# Patient Record
Sex: MALE | Race: WHITE | NOT HISPANIC OR LATINO | Employment: FULL TIME | URBAN - METROPOLITAN AREA
[De-identification: names, ages, dates, MRNs, and addresses within clinical notes are randomized per-mention and may not be internally consistent; named-entity substitution may affect disease eponyms.]

---

## 2019-12-08 ENCOUNTER — HOSPITAL ENCOUNTER (INPATIENT)
Facility: HOSPITAL | Age: 49
LOS: 2 days | Discharge: HOME | End: 2019-12-10
Attending: EMERGENCY MEDICINE | Admitting: INTERNAL MEDICINE

## 2019-12-08 ENCOUNTER — APPOINTMENT (OUTPATIENT)
Dept: GENERAL RADIOLOGY | Facility: HOSPITAL | Age: 49
End: 2019-12-08

## 2019-12-08 DIAGNOSIS — R79.89 ELEVATED LFTS: ICD-10-CM

## 2019-12-08 DIAGNOSIS — I21.11 ST ELEVATION MYOCARDIAL INFARCTION INVOLVING RIGHT CORONARY ARTERY (HCC): ICD-10-CM

## 2019-12-08 DIAGNOSIS — I21.19 ACUTE MI, INFERIOR WALL (HCC): Primary | ICD-10-CM

## 2019-12-08 LAB
ALBUMIN SERPL-MCNC: 4 G/DL (ref 3.5–5.2)
ALBUMIN/GLOB SERPL: 1.6 G/DL
ALP SERPL-CCNC: 268 U/L (ref 39–117)
ALT SERPL W P-5'-P-CCNC: 26 U/L (ref 1–41)
ANION GAP SERPL CALCULATED.3IONS-SCNC: 11.3 MMOL/L (ref 5–15)
AST SERPL-CCNC: 21 U/L (ref 1–40)
BASOPHILS # BLD AUTO: 0.07 10*3/MM3 (ref 0–0.2)
BASOPHILS NFR BLD AUTO: 1.1 % (ref 0–1.5)
BILIRUB SERPL-MCNC: 0.3 MG/DL (ref 0.2–1.2)
BUN BLD-MCNC: 15 MG/DL (ref 6–20)
BUN/CREAT SERPL: 12.4 (ref 7–25)
CALCIUM SPEC-SCNC: 8.8 MG/DL (ref 8.6–10.5)
CHLORIDE SERPL-SCNC: 107 MMOL/L (ref 98–107)
CO2 SERPL-SCNC: 24.7 MMOL/L (ref 22–29)
CREAT BLD-MCNC: 1.21 MG/DL (ref 0.76–1.27)
DEPRECATED RDW RBC AUTO: 39.6 FL (ref 37–54)
EOSINOPHIL # BLD AUTO: 0.28 10*3/MM3 (ref 0–0.4)
EOSINOPHIL NFR BLD AUTO: 4.4 % (ref 0.3–6.2)
ERYTHROCYTE [DISTWIDTH] IN BLOOD BY AUTOMATED COUNT: 11.8 % (ref 12.3–15.4)
GFR SERPL CREATININE-BSD FRML MDRD: 64 ML/MIN/1.73
GLOBULIN UR ELPH-MCNC: 2.5 GM/DL
GLUCOSE BLD-MCNC: 131 MG/DL (ref 65–99)
GLUCOSE BLDC GLUCOMTR-MCNC: 96 MG/DL (ref 70–130)
HCT VFR BLD AUTO: 41.2 % (ref 37.5–51)
HGB BLD-MCNC: 14.8 G/DL (ref 13–17.7)
HOLD SPECIMEN: NORMAL
HOLD SPECIMEN: NORMAL
IMM GRANULOCYTES # BLD AUTO: 0.05 10*3/MM3 (ref 0–0.05)
IMM GRANULOCYTES NFR BLD AUTO: 0.8 % (ref 0–0.5)
INR PPP: 1.08 (ref 0.9–1.1)
LYMPHOCYTES # BLD AUTO: 1.87 10*3/MM3 (ref 0.7–3.1)
LYMPHOCYTES NFR BLD AUTO: 29.5 % (ref 19.6–45.3)
MAGNESIUM SERPL-MCNC: 2 MG/DL (ref 1.6–2.6)
MCH RBC QN AUTO: 33 PG (ref 26.6–33)
MCHC RBC AUTO-ENTMCNC: 35.9 G/DL (ref 31.5–35.7)
MCV RBC AUTO: 92 FL (ref 79–97)
MONOCYTES # BLD AUTO: 0.59 10*3/MM3 (ref 0.1–0.9)
MONOCYTES NFR BLD AUTO: 9.3 % (ref 5–12)
NEUTROPHILS # BLD AUTO: 3.47 10*3/MM3 (ref 1.7–7)
NEUTROPHILS NFR BLD AUTO: 54.9 % (ref 42.7–76)
NRBC BLD AUTO-RTO: 0 /100 WBC (ref 0–0.2)
PLATELET # BLD AUTO: 213 10*3/MM3 (ref 140–450)
PMV BLD AUTO: 10.1 FL (ref 6–12)
POTASSIUM BLD-SCNC: 4.5 MMOL/L (ref 3.5–5.2)
PROT SERPL-MCNC: 6.5 G/DL (ref 6–8.5)
PROTHROMBIN TIME: 13.7 SECONDS (ref 11.7–14.2)
RBC # BLD AUTO: 4.48 10*6/MM3 (ref 4.14–5.8)
SODIUM BLD-SCNC: 143 MMOL/L (ref 136–145)
TROPONIN T SERPL-MCNC: 0.63 NG/ML (ref 0–0.03)
TROPONIN T SERPL-MCNC: <0.01 NG/ML (ref 0–0.03)
WBC NRBC COR # BLD: 6.33 10*3/MM3 (ref 3.4–10.8)
WHOLE BLOOD HOLD SPECIMEN: NORMAL
WHOLE BLOOD HOLD SPECIMEN: NORMAL

## 2019-12-08 PROCEDURE — 85610 PROTHROMBIN TIME: CPT | Performed by: EMERGENCY MEDICINE

## 2019-12-08 PROCEDURE — 027034Z DILATION OF CORONARY ARTERY, ONE ARTERY WITH DRUG-ELUTING INTRALUMINAL DEVICE, PERCUTANEOUS APPROACH: ICD-10-PCS | Performed by: INTERNAL MEDICINE

## 2019-12-08 PROCEDURE — 99223 1ST HOSP IP/OBS HIGH 75: CPT | Performed by: INTERNAL MEDICINE

## 2019-12-08 PROCEDURE — 82962 GLUCOSE BLOOD TEST: CPT

## 2019-12-08 PROCEDURE — 25010000002 BH (CUPID ONLY) ADENOSINE 6 MG/100ML MIXTURE: Performed by: INTERNAL MEDICINE

## 2019-12-08 PROCEDURE — B2151ZZ FLUOROSCOPY OF LEFT HEART USING LOW OSMOLAR CONTRAST: ICD-10-PCS | Performed by: INTERNAL MEDICINE

## 2019-12-08 PROCEDURE — 92941 PRQ TRLML REVSC TOT OCCL AMI: CPT | Performed by: INTERNAL MEDICINE

## 2019-12-08 PROCEDURE — 25010000002 HEPARIN (PORCINE) PER 1000 UNITS: Performed by: INTERNAL MEDICINE

## 2019-12-08 PROCEDURE — 99283 EMERGENCY DEPT VISIT LOW MDM: CPT

## 2019-12-08 PROCEDURE — 25010000002 ONDANSETRON PER 1 MG: Performed by: INTERNAL MEDICINE

## 2019-12-08 PROCEDURE — 85347 COAGULATION TIME ACTIVATED: CPT

## 2019-12-08 PROCEDURE — B2111ZZ FLUOROSCOPY OF MULTIPLE CORONARY ARTERIES USING LOW OSMOLAR CONTRAST: ICD-10-PCS | Performed by: INTERNAL MEDICINE

## 2019-12-08 PROCEDURE — 93005 ELECTROCARDIOGRAM TRACING: CPT | Performed by: INTERNAL MEDICINE

## 2019-12-08 PROCEDURE — 25010000002 MIDAZOLAM PER 1 MG: Performed by: INTERNAL MEDICINE

## 2019-12-08 PROCEDURE — C1769 GUIDE WIRE: HCPCS | Performed by: INTERNAL MEDICINE

## 2019-12-08 PROCEDURE — C1874 STENT, COATED/COV W/DEL SYS: HCPCS | Performed by: INTERNAL MEDICINE

## 2019-12-08 PROCEDURE — C9606 PERC D-E COR REVASC W AMI S: HCPCS | Performed by: INTERNAL MEDICINE

## 2019-12-08 PROCEDURE — 4A023N7 MEASUREMENT OF CARDIAC SAMPLING AND PRESSURE, LEFT HEART, PERCUTANEOUS APPROACH: ICD-10-PCS | Performed by: INTERNAL MEDICINE

## 2019-12-08 PROCEDURE — C1725 CATH, TRANSLUMIN NON-LASER: HCPCS | Performed by: INTERNAL MEDICINE

## 2019-12-08 PROCEDURE — 25010000002 PHENYLEPHRINE PER 1 ML: Performed by: INTERNAL MEDICINE

## 2019-12-08 PROCEDURE — 0 IOPAMIDOL PER 1 ML: Performed by: INTERNAL MEDICINE

## 2019-12-08 PROCEDURE — 80053 COMPREHEN METABOLIC PANEL: CPT | Performed by: EMERGENCY MEDICINE

## 2019-12-08 PROCEDURE — C1894 INTRO/SHEATH, NON-LASER: HCPCS

## 2019-12-08 PROCEDURE — 93458 L HRT ARTERY/VENTRICLE ANGIO: CPT | Performed by: INTERNAL MEDICINE

## 2019-12-08 PROCEDURE — C1894 INTRO/SHEATH, NON-LASER: HCPCS | Performed by: INTERNAL MEDICINE

## 2019-12-08 PROCEDURE — 85025 COMPLETE CBC W/AUTO DIFF WBC: CPT | Performed by: EMERGENCY MEDICINE

## 2019-12-08 PROCEDURE — 93010 ELECTROCARDIOGRAM REPORT: CPT | Performed by: INTERNAL MEDICINE

## 2019-12-08 PROCEDURE — 83735 ASSAY OF MAGNESIUM: CPT | Performed by: EMERGENCY MEDICINE

## 2019-12-08 PROCEDURE — 84484 ASSAY OF TROPONIN QUANT: CPT | Performed by: EMERGENCY MEDICINE

## 2019-12-08 PROCEDURE — 84484 ASSAY OF TROPONIN QUANT: CPT | Performed by: INTERNAL MEDICINE

## 2019-12-08 PROCEDURE — 25010000002 ATROPINE PER 0.01 MG: Performed by: INTERNAL MEDICINE

## 2019-12-08 PROCEDURE — C1887 CATHETER, GUIDING: HCPCS | Performed by: INTERNAL MEDICINE

## 2019-12-08 DEVICE — XIENCE SIERRA™ EVEROLIMUS ELUTING CORONARY STENT SYSTEM 3.00 MM X 33 MM / RAPID-EXCHANGE
Type: IMPLANTABLE DEVICE | Status: FUNCTIONAL
Brand: XIENCE SIERRA™

## 2019-12-08 RX ORDER — PRASUGREL 10 MG/1
60 TABLET, FILM COATED ORAL ONCE
Status: DISCONTINUED | OUTPATIENT
Start: 2019-12-08 | End: 2019-12-10 | Stop reason: HOSPADM

## 2019-12-08 RX ORDER — LIDOCAINE HYDROCHLORIDE 20 MG/ML
INJECTION, SOLUTION INFILTRATION; PERINEURAL AS NEEDED
Status: DISCONTINUED | OUTPATIENT
Start: 2019-12-08 | End: 2019-12-08 | Stop reason: HOSPADM

## 2019-12-08 RX ORDER — HEPARIN SODIUM 5000 [USP'U]/ML
5000 INJECTION, SOLUTION INTRAVENOUS; SUBCUTANEOUS ONCE
Status: CANCELLED | OUTPATIENT
Start: 2019-12-08 | End: 2019-12-08

## 2019-12-08 RX ORDER — SODIUM CHLORIDE 9 MG/ML
INJECTION, SOLUTION INTRAVENOUS CONTINUOUS PRN
Status: COMPLETED | OUTPATIENT
Start: 2019-12-08 | End: 2019-12-08

## 2019-12-08 RX ORDER — NITROGLYCERIN 20 MG/100ML
10 INJECTION INTRAVENOUS
Status: DISCONTINUED | OUTPATIENT
Start: 2019-12-08 | End: 2019-12-08

## 2019-12-08 RX ORDER — ONDANSETRON 2 MG/ML
INJECTION INTRAMUSCULAR; INTRAVENOUS AS NEEDED
Status: DISCONTINUED | OUTPATIENT
Start: 2019-12-08 | End: 2019-12-08 | Stop reason: HOSPADM

## 2019-12-08 RX ORDER — ONDANSETRON 4 MG/1
4 TABLET, FILM COATED ORAL EVERY 6 HOURS PRN
Status: DISCONTINUED | OUTPATIENT
Start: 2019-12-08 | End: 2019-12-10 | Stop reason: HOSPADM

## 2019-12-08 RX ORDER — ASPIRIN 81 MG/1
81 TABLET, CHEWABLE ORAL DAILY
COMMUNITY
End: 2019-12-10 | Stop reason: HOSPADM

## 2019-12-08 RX ORDER — VERAPAMIL HYDROCHLORIDE 2.5 MG/ML
INJECTION, SOLUTION INTRAVENOUS AS NEEDED
Status: DISCONTINUED | OUTPATIENT
Start: 2019-12-08 | End: 2019-12-08 | Stop reason: HOSPADM

## 2019-12-08 RX ORDER — ONDANSETRON 2 MG/ML
4 INJECTION INTRAMUSCULAR; INTRAVENOUS EVERY 6 HOURS PRN
Status: DISCONTINUED | OUTPATIENT
Start: 2019-12-08 | End: 2019-12-10 | Stop reason: HOSPADM

## 2019-12-08 RX ORDER — SODIUM CHLORIDE 9 MG/ML
50 INJECTION, SOLUTION INTRAVENOUS CONTINUOUS
Status: ACTIVE | OUTPATIENT
Start: 2019-12-08 | End: 2019-12-09

## 2019-12-08 RX ORDER — HYDROCODONE BITARTRATE AND ACETAMINOPHEN 5; 325 MG/1; MG/1
1 TABLET ORAL EVERY 4 HOURS PRN
Status: DISCONTINUED | OUTPATIENT
Start: 2019-12-08 | End: 2019-12-10 | Stop reason: HOSPADM

## 2019-12-08 RX ORDER — SODIUM CHLORIDE 0.9 % (FLUSH) 0.9 %
10 SYRINGE (ML) INJECTION AS NEEDED
Status: DISCONTINUED | OUTPATIENT
Start: 2019-12-08 | End: 2019-12-10 | Stop reason: HOSPADM

## 2019-12-08 RX ORDER — HEPARIN SODIUM 1000 [USP'U]/ML
INJECTION, SOLUTION INTRAVENOUS; SUBCUTANEOUS AS NEEDED
Status: DISCONTINUED | OUTPATIENT
Start: 2019-12-08 | End: 2019-12-08 | Stop reason: HOSPADM

## 2019-12-08 RX ORDER — PROBENECID 500 MG/1
500 TABLET, FILM COATED ORAL 2 TIMES DAILY
COMMUNITY

## 2019-12-08 RX ORDER — METOPROLOL TARTRATE 5 MG/5ML
INJECTION INTRAVENOUS AS NEEDED
Status: DISCONTINUED | OUTPATIENT
Start: 2019-12-08 | End: 2019-12-08 | Stop reason: HOSPADM

## 2019-12-08 RX ORDER — NALOXONE HCL 0.4 MG/ML
0.4 VIAL (ML) INJECTION
Status: DISCONTINUED | OUTPATIENT
Start: 2019-12-08 | End: 2019-12-08

## 2019-12-08 RX ORDER — ATORVASTATIN CALCIUM 20 MG/1
40 TABLET, FILM COATED ORAL NIGHTLY
Status: DISCONTINUED | OUTPATIENT
Start: 2019-12-08 | End: 2019-12-10 | Stop reason: HOSPADM

## 2019-12-08 RX ORDER — MORPHINE SULFATE 2 MG/ML
2 INJECTION, SOLUTION INTRAMUSCULAR; INTRAVENOUS
Status: DISCONTINUED | OUTPATIENT
Start: 2019-12-08 | End: 2019-12-08

## 2019-12-08 RX ORDER — ACETAMINOPHEN 325 MG/1
650 TABLET ORAL EVERY 4 HOURS PRN
Status: DISCONTINUED | OUTPATIENT
Start: 2019-12-08 | End: 2019-12-10 | Stop reason: HOSPADM

## 2019-12-08 RX ORDER — PRASUGREL 10 MG/1
60 TABLET, FILM COATED ORAL DAILY
Status: DISCONTINUED | OUTPATIENT
Start: 2019-12-09 | End: 2019-12-08

## 2019-12-08 RX ORDER — MIDAZOLAM HYDROCHLORIDE 1 MG/ML
INJECTION INTRAMUSCULAR; INTRAVENOUS AS NEEDED
Status: DISCONTINUED | OUTPATIENT
Start: 2019-12-08 | End: 2019-12-08 | Stop reason: HOSPADM

## 2019-12-08 RX ORDER — HEPARIN SODIUM 5000 [USP'U]/ML
INJECTION, SOLUTION INTRAVENOUS; SUBCUTANEOUS
Status: COMPLETED | OUTPATIENT
Start: 2019-12-08 | End: 2019-12-08

## 2019-12-08 RX ORDER — ATROPINE SULFATE 1 MG/ML
INJECTION, SOLUTION INTRAMUSCULAR; INTRAVENOUS; SUBCUTANEOUS AS NEEDED
Status: DISCONTINUED | OUTPATIENT
Start: 2019-12-08 | End: 2019-12-08 | Stop reason: HOSPADM

## 2019-12-08 RX ORDER — TEMAZEPAM 15 MG/1
15 CAPSULE ORAL NIGHTLY PRN
Status: DISCONTINUED | OUTPATIENT
Start: 2019-12-08 | End: 2019-12-10 | Stop reason: HOSPADM

## 2019-12-08 RX ORDER — CLOPIDOGREL BISULFATE 75 MG/1
TABLET ORAL
Status: COMPLETED | OUTPATIENT
Start: 2019-12-08 | End: 2019-12-08

## 2019-12-08 RX ORDER — NITROGLYCERIN 20 MG/100ML
INJECTION INTRAVENOUS
Status: COMPLETED | OUTPATIENT
Start: 2019-12-08 | End: 2019-12-08

## 2019-12-08 RX ORDER — PRASUGREL 10 MG/1
10 TABLET, FILM COATED ORAL DAILY
Status: DISCONTINUED | OUTPATIENT
Start: 2019-12-10 | End: 2019-12-10 | Stop reason: HOSPADM

## 2019-12-08 RX ORDER — CLOPIDOGREL BISULFATE 75 MG/1
600 TABLET ORAL ONCE
Status: DISCONTINUED | OUTPATIENT
Start: 2019-12-08 | End: 2019-12-08

## 2019-12-08 RX ADMIN — SODIUM CHLORIDE 50 ML/HR: 9 INJECTION, SOLUTION INTRAVENOUS at 18:07

## 2019-12-08 RX ADMIN — NITROGLYCERIN 10 MCG/MIN: 20 INJECTION INTRAVENOUS at 15:15

## 2019-12-08 RX ADMIN — METOPROLOL TARTRATE 25 MG: 25 TABLET ORAL at 20:16

## 2019-12-08 RX ADMIN — ATORVASTATIN CALCIUM 40 MG: 20 TABLET, FILM COATED ORAL at 20:16

## 2019-12-08 RX ADMIN — HEPARIN SODIUM 5000 UNITS: 5000 INJECTION, SOLUTION INTRAVENOUS; SUBCUTANEOUS at 15:15

## 2019-12-08 RX ADMIN — CLOPIDOGREL BISULFATE 300 MG: 75 TABLET ORAL at 15:14

## 2019-12-08 NOTE — ED NOTES
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 Patient given meds in Flora Demarco RN  12/08/19 0566

## 2019-12-08 NOTE — ED PROVIDER NOTES
EMERGENCY DEPARTMENT ENCOUNTER    CHIEF COMPLAINT  Chief Complaint: Chest pain  History given by: Patient, EMS  History limited by: None  Room Number: SERAFIN/SERAFIN  PMD: System, Provider Not In      HPI:  Pt is a 49 y.o. 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 Patient was transported by [facility] who activated the cath lab. Patient has stopped in the ED briefly while the cath lab is being prepared. Per EMS, the patient became pale, diaphoretic and nauseated at the onset of his pain. Patient's initial BP was in the 90s systolically, but improved after fluids were administered. Per EMS, the patient was given 4 mg Zofran and he was started on IV nitro en route. Patient has no known cardiac history, but reports his father had coronary artery bypass surgery in his early 50s.     Duration/Onset/Timin/gradual/constant  Location: substernal chest  Radiation: none  Quality: pain  Intensity/Severity: severe  Associated Symptoms: SOA, nausea, pallor, diaphoresis  Aggravating or Alleviating Factors: none  Previous Episodes: no      PAST MEDICAL HISTORY  Active Ambulatory Problems     Diagnosis Date Noted   • No Active Ambulatory Problems     Resolved Ambulatory Problems     Diagnosis Date Noted   • No Resolved Ambulatory Problems     No Additional Past Medical History       PAST SURGICAL HISTORY  No past surgical history on file.    FAMILY HISTORY  No family history on file.    SOCIAL HISTORY  Social History     Socioeconomic History   • Marital status: Unknown     Spouse name: Not on file   • Number of children: Not on file   • Years of education: Not on file   • Highest education level: Not on file       ALLERGIES  Morphine    REVIEW OF SYSTEMS  Review of Systems   Constitutional: Positive for diaphoresis. Negative for activity change, appetite change and fever.   HENT: Negative for congestion and sore throat.     Eyes: Negative.    Respiratory: Positive for shortness of breath. Negative for cough.    Cardiovascular: Positive for chest pain. Negative for leg swelling.   Gastrointestinal: Positive for nausea. Negative for abdominal pain, diarrhea and vomiting.   Endocrine: Negative.    Genitourinary: Negative for decreased urine volume and dysuria.   Musculoskeletal: Negative for neck pain.   Skin: Positive for pallor. Negative for rash and wound.   Allergic/Immunologic: Negative.    Neurological: Negative for weakness, numbness and headaches.   Hematological: Negative.    Psychiatric/Behavioral: Negative.    All other systems reviewed and are negative.      PHYSICAL EXAM  ED Triage Vitals [12/08/19 1514]   Temp Heart Rate Resp BP SpO2   -- 107 20 126/87 96 %      Temp src Heart Rate Source Patient Position BP Location FiO2 (%)   -- -- -- -- --       Physical Exam   Constitutional: He is oriented to person, place, and time. He appears distressed (moderate).   HENT:   Head: Normocephalic and atraumatic.   Mouth/Throat: Mucous membranes are dry.   Eyes: Pupils are equal, round, and reactive to light. EOM are normal.   Neck: Normal range of motion. Neck supple.   Cardiovascular: Normal rate, regular rhythm and normal heart sounds. Exam reveals no gallop and no friction rub.   No murmur heard.  Pulmonary/Chest: Effort normal and breath sounds normal. No respiratory distress. He has no wheezes. He has no rales.   Abdominal: Soft. There is no tenderness. There is no rebound and no guarding.   Musculoskeletal: Normal range of motion. He exhibits no edema.   Neurological: He is alert and oriented to person, place, and time. He has normal sensation and normal strength.   Skin: Skin is warm. He is diaphoretic. There is pallor.   Nursing note and vitals reviewed.      LAB RESULTS  Lab Results (last 24 hours)     Procedure Component Value Units Date/Time    CBC & Differential [[id number]] Collected:  12/08/19 1519    Specimen:  Blood  Updated:  12/08/19 1525    Narrative:       The following orders were created for panel order CBC & Differential.  Procedure                               Abnormality         Status                     ---------                               -----------         ------                     CBC Auto Differential[[id number]]        Abnormal            Final result                 Please view results for these tests on the individual orders.    Troponin [[id number]]  (Normal) Collected:  12/08/19 1519    Specimen:  Blood Updated:  12/08/19 1543     Troponin T <0.010 ng/mL     Narrative:       Troponin T Reference Range:  <= 0.03 ng/mL-   Negative for AMI  >0.03 ng/mL-     Abnormal for myocardial necrosis.  Clinicians would have to utilize clinical acumen, EKG, Troponin and serial changes to determine if it is an Acute Myocardial Infarction or myocardial injury due to an underlying chronic condition.     Comprehensive Metabolic Panel [[id number]]  (Abnormal) Collected:  12/08/19 1519    Specimen:  Blood Updated:  12/08/19 1543     Glucose 131 mg/dL      BUN 15 mg/dL      Creatinine 1.21 mg/dL      Sodium 143 mmol/L      Potassium 4.5 mmol/L      Chloride 107 mmol/L      CO2 24.7 mmol/L      Calcium 8.8 mg/dL      Total Protein 6.5 g/dL      Albumin 4.00 g/dL      ALT (SGPT) 26 U/L      AST (SGOT) 21 U/L      Alkaline Phosphatase 268 U/L      Total Bilirubin 0.3 mg/dL      eGFR Non African Amer 64 mL/min/1.73      Globulin 2.5 gm/dL      A/G Ratio 1.6 g/dL      BUN/Creatinine Ratio 12.4     Anion Gap 11.3 mmol/L     Narrative:       GFR Normal >60  Chronic Kidney Disease <60  Kidney Failure <15      Magnesium [[id number]]  (Normal) Collected:  12/08/19 1519    Specimen:  Blood Updated:  12/08/19 1543     Magnesium 2.0 mg/dL     Protime-INR [[id number]]  (Normal) Collected:  12/08/19 1519    Specimen:  Blood Updated:  12/08/19 1534     Protime 13.7 Seconds      INR 1.08    CBC Auto Differential [[id number]]  (Abnormal) Collected:   12/08/19 1519    Specimen:  Blood Updated:  12/08/19 1525     WBC 6.33 10*3/mm3      RBC 4.48 10*6/mm3      Hemoglobin 14.8 g/dL      Hematocrit 41.2 %      MCV 92.0 fL      MCH 33.0 pg      MCHC 35.9 g/dL      RDW 11.8 %      RDW-SD 39.6 fl      MPV 10.1 fL      Platelets 213 10*3/mm3      Neutrophil % 54.9 %      Lymphocyte % 29.5 %      Monocyte % 9.3 %      Eosinophil % 4.4 %      Basophil % 1.1 %      Immature Grans % 0.8 %      Neutrophils, Absolute 3.47 10*3/mm3      Lymphocytes, Absolute 1.87 10*3/mm3      Monocytes, Absolute 0.59 10*3/mm3      Eosinophils, Absolute 0.28 10*3/mm3      Basophils, Absolute 0.07 10*3/mm3      Immature Grans, Absolute 0.05 10*3/mm3      nRBC 0.0 /100 WBC           I ordered the above labs and reviewed the results    RADIOLOGY  XR Chest 1 View    (Results Pending)        I ordered the above noted radiological studies. Interpreted by radiologist. Reviewed by me in PACS.       PROCEDURES  Procedures    Pre-hospital EKG    Acute inferior ST elevation with reciprocal changes anteriorly consistent with an acute MI    No previous for comparison    Interpreted Contemporaneously by me.  Independently viewed by me.      PROGRESS AND CONSULTS     1515 Labs ordered for evaluation. CXR ordered for evaluation.    1520 Dr. Linton is in the patient's room at this time. He will take the patient to the cath lab for further treatment.      MEDICAL DECISION MAKING  Results were reviewed/discussed with the patient and they were also made aware of online access. Pt also made aware that some labs, such as cultures, will not be resulted during ER visit and follow up with PMD is necessary.     MDM  Number of Diagnoses or Management Options  Acute MI, inferior wall (CMS/HCC):      Amount and/or Complexity of Data Reviewed  Clinical lab tests: ordered and reviewed  Tests in the radiology section of CPT®: ordered and reviewed  Tests in the medicine section of CPT®: ordered and reviewed (See procedure note  for EKG.)  Decide to obtain previous medical records or to obtain history from someone other than the patient: yes (Epic)  Obtain history from someone other than the patient: yes (EMS)  Review and summarize past medical records: yes  Discuss the patient with other providers: yes (Dr. Royer MD)  Independent visualization of images, tracings, or specimens: yes    Patient Progress  Patient progress: stable         DIAGNOSIS  Final diagnoses:   Acute MI, inferior wall (CMS/HCC)       DISPOSITION  ADMISSION TO CATH LAB    Discussed treatment plan and reason for admission with pt/family and admitting physician.  Pt/family voiced understanding of the plan for admission for further testing/treatment as needed.       Latest Documented Vital Signs:  As of 3:51 PM  BP- 126/87 HR- 107 Temp-   O2 sat- 96%    --  Documentation assistance provided by ankit Sanchez for Dr. Jill MD.  Information recorded by the scribe was done at my direction and has been verified and validated by me.       Adeline Sanchez  12/08/19 0075       Gerard Melchor MD  12/08/19 0846

## 2019-12-08 NOTE — H&P
Date of Hospital Visit: 19  Encounter Provider: Erasto Linton MD  Place of Service: [facility] CARDIOLOGY  Patient Name: Baron Abdullahi  :1970        Chief complaint  Chest pain  Inferior STEMI    History of Present Illness  Very pleasant 49-year-old male with no significant cardiac history and really only history of prior tobacco use who presented with the acute onset of central chest discomfort this afternoon.  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  He noticed the onset of chest pain that was at least moderate to severe in intensity and associated with diaphoresis.  It was central and did not radiate.  It was not associated with nausea or vomiting.  He denied any shortness of breath.  Secondary to his chest pain EMS was called.  He was noted of inferior ST elevation and he was life flighted here.    He was transferred to the cardiac catheterization lab urgently as an ST elevation MI.  He underwent coronary angiography with a subtotally occluded distal RCA with HALEY I flow distally.  He underwent PCI to the distal RCA with a 3.0 x 33 mm Xience Lainey drug-eluting stent, postdilated to 3.5 mm in the proximal segment and 3.25 mm in the distal segment.  Secondary to distal cut off of the RPDA also received intracoronary Integrilin.  His chest pain is still present however improving.  He still has residual ST elevation although also improving.    Past medical history:  Knee surgery: Orthopedic  Hearing loss unilateral      Current Meds  Scheduled Meds:  clopidogrel 600 mg Oral Once     Continuous Infusions:  nitroglycerin 10 mcg/min     PRN Meds:.sodium chloride    Allergies as of 2019 - Reviewed 2019   Allergen Reaction Noted   • Morphine Hives 2019          Family history  Family history of early myocardial infarction and bypass surgery in his father.    Social history  History of tobacco abuse.  No longer uses.    REVIEW OF  SYSTEMS:   12 point ROS was performed and is negative except as outlined in HPI       Objective:   Heart Rate:  [107] 107  Resp:  [20] 20  BP: (126)/(87) 126/87  There is no height or weight on file to calculate BMI.    Vitals:    12/08/19 1514   BP: 126/87   Pulse: 107   Resp: 20   SpO2: 96%     Heart Rate:  [107] 107  Resp:  [20] 20  BP: (126)/(87) 126/87  No intake or output data in the 24 hours ending 12/08/19 1523      General Appearance:    Alert, cooperative, in no acute distress   Head:    Normocephalic, without obvious abnormality, atraumatic       Neck:   No adenopathy, supple, no thyromegaly, no carotid bruit, no    JVD   Lungs:     Clear to auscultation bilaterally, no wheezes, rales, or     rhonchi    Heart:    Normal rate, regular rhythm, no murmur, no rub, no gallop   Chest Wall:    No abnormalities observed   Abdomen:     Normal bowel sounds, soft, nontender, nondistended,            no rebound tenderness   Extremities:   No cyanosis, clubbing, or edema   Pulses:   Pulses palpable and equal bilaterally   Skin:   No bleeding or rash       Neurologic:   Cranial nerves 2 - 12 grossly intact, sensation intact           All labs have been reviewed.  CBC and BMP within normal limits.  Troponin negative on arrival.    [facility]  Inferior ST elevation consistent with injury      Assessment and Plan:  1.  Inferior STEMI  2.  Coronary artery disease: Subtotally occluded RCA.  Discrete 60% mid circumflex coronary artery stenosis.  Diffuse 30% mid vessel LAD stenosis  3.  History of tobacco abuse  4.  Elevated alkaline phosphatase      -Continue dual antiplatelet therapy at least at this time with aspirin and Plavix as he does have Integrilin on board.  I will plan on moving to [facility].  -Start low-dose metoprolol tartrate  -Atorvastatin 40 mg nightly with plan to increase to 80 mg if lipid panel looks bad.  -Routine transthoracic echocardiogram  -Elevated alkaline phosphatase I do think we  probably should have a a right upper quadrant ultrasound and consider GI evaluation as well.  I will see what the ultrasound shows us.    Erasto Linton MD  12/08/19  3:20 PM.  Time spent in reviewing chart, discussion and examination:

## 2019-12-09 ENCOUNTER — APPOINTMENT (OUTPATIENT)
Dept: CARDIOLOGY | Facility: HOSPITAL | Age: 49
End: 2019-12-09

## 2019-12-09 ENCOUNTER — APPOINTMENT (OUTPATIENT)
Dept: ULTRASOUND IMAGING | Facility: HOSPITAL | Age: 49
End: 2019-12-09

## 2019-12-09 LAB
ACT BLD: 153 SECONDS (ref 82–152)
ACT BLD: 153 SECONDS (ref 82–152)
ACT BLD: 164 SECONDS (ref 82–152)
ACT BLD: 230 SECONDS (ref 82–152)
ALBUMIN SERPL-MCNC: 3.5 G/DL (ref 3.5–5.2)
ALP SERPL-CCNC: 230 U/L (ref 39–117)
ALT SERPL W P-5'-P-CCNC: 27 U/L (ref 1–41)
ANION GAP SERPL CALCULATED.3IONS-SCNC: 12 MMOL/L (ref 5–15)
AORTIC DIMENSIONLESS INDEX: 0.6 (DI)
AST SERPL-CCNC: 75 U/L (ref 1–40)
BH CV ECHO MEAS - ACS: 2.3 CM
BH CV ECHO MEAS - AO MAX PG (FULL): 2.5 MMHG
BH CV ECHO MEAS - AO MAX PG: 5.8 MMHG
BH CV ECHO MEAS - AO MEAN PG (FULL): 1 MMHG
BH CV ECHO MEAS - AO MEAN PG: 3 MMHG
BH CV ECHO MEAS - AO ROOT AREA (BSA CORRECTED): 1.6
BH CV ECHO MEAS - AO ROOT AREA: 9.1 CM^2
BH CV ECHO MEAS - AO ROOT DIAM: 3.4 CM
BH CV ECHO MEAS - AO V2 MAX: 120 CM/SEC
BH CV ECHO MEAS - AO V2 MEAN: 77.7 CM/SEC
BH CV ECHO MEAS - AO V2 VTI: 27.9 CM
BH CV ECHO MEAS - ASC AORTA: 3.1 CM
BH CV ECHO MEAS - AVA(I,A): 2.2 CM^2
BH CV ECHO MEAS - AVA(I,D): 2.2 CM^2
BH CV ECHO MEAS - AVA(V,A): 2.6 CM^2
BH CV ECHO MEAS - AVA(V,D): 2.6 CM^2
BH CV ECHO MEAS - BSA(HAYCOCK): 2.2 M^2
BH CV ECHO MEAS - BSA: 2.2 M^2
BH CV ECHO MEAS - BZI_BMI: 29 KILOGRAMS/M^2
BH CV ECHO MEAS - BZI_METRIC_HEIGHT: 182 CM
BH CV ECHO MEAS - BZI_METRIC_WEIGHT: 96 KG
BH CV ECHO MEAS - EDV(MOD-SP2): 87 ML
BH CV ECHO MEAS - EDV(MOD-SP4): 125 ML
BH CV ECHO MEAS - EF(MOD-BP): 49 %
BH CV ECHO MEAS - EF(MOD-SP2): 51.7 %
BH CV ECHO MEAS - EF(MOD-SP4): 46.4 %
BH CV ECHO MEAS - ESV(MOD-SP2): 42 ML
BH CV ECHO MEAS - ESV(MOD-SP4): 67 ML
BH CV ECHO MEAS - LAT PEAK E' VEL: 14.3 CM/SEC
BH CV ECHO MEAS - LV DIASTOLIC VOL/BSA (35-75): 57.5 ML/M^2
BH CV ECHO MEAS - LV MAX PG: 3.3 MMHG
BH CV ECHO MEAS - LV MEAN PG: 2 MMHG
BH CV ECHO MEAS - LV SYSTOLIC VOL/BSA (12-30): 30.8 ML/M^2
BH CV ECHO MEAS - LV V1 MAX: 90.7 CM/SEC
BH CV ECHO MEAS - LV V1 MEAN: 63.2 CM/SEC
BH CV ECHO MEAS - LV V1 VTI: 18 CM
BH CV ECHO MEAS - LVLD AP2: 8.6 CM
BH CV ECHO MEAS - LVLD AP4: 8.6 CM
BH CV ECHO MEAS - LVLS AP2: 7.2 CM
BH CV ECHO MEAS - LVLS AP4: 7.2 CM
BH CV ECHO MEAS - LVOT AREA (M): 3.5 CM^2
BH CV ECHO MEAS - LVOT AREA: 3.5 CM^2
BH CV ECHO MEAS - LVOT DIAM: 2.1 CM
BH CV ECHO MEAS - MED PEAK E' VEL: 8.27 CM/SEC
BH CV ECHO MEAS - MV A DUR: 130 SEC
BH CV ECHO MEAS - MV A MAX VEL: 50.6 CM/SEC
BH CV ECHO MEAS - MV DEC TIME: 155 SEC
BH CV ECHO MEAS - MV E MAX VEL: 52.3 CM/SEC
BH CV ECHO MEAS - MV E/A: 1
BH CV ECHO MEAS - PA ACC TIME: 0.1 SEC
BH CV ECHO MEAS - PA MAX PG (FULL): 1.9 MMHG
BH CV ECHO MEAS - PA MAX PG: 2.9 MMHG
BH CV ECHO MEAS - PA PR(ACCEL): 36.3 MMHG
BH CV ECHO MEAS - PA V2 MAX: 84.5 CM/SEC
BH CV ECHO MEAS - PULM A REVS DUR: 0.14 SEC
BH CV ECHO MEAS - PULM A REVS VEL: 30.4 CM/SEC
BH CV ECHO MEAS - PULM DIAS VEL: 24 CM/SEC
BH CV ECHO MEAS - PULM S/D: 2.4
BH CV ECHO MEAS - PULM SYS VEL: 57 CM/SEC
BH CV ECHO MEAS - PVA(V,A): 2.4 CM^2
BH CV ECHO MEAS - PVA(V,D): 2.4 CM^2
BH CV ECHO MEAS - QP/QS: 0.74
BH CV ECHO MEAS - RV MAX PG: 0.92 MMHG
BH CV ECHO MEAS - RV MEAN PG: 1 MMHG
BH CV ECHO MEAS - RV V1 MAX: 48 CM/SEC
BH CV ECHO MEAS - RV V1 MEAN: 35.1 CM/SEC
BH CV ECHO MEAS - RV V1 VTI: 11.1 CM
BH CV ECHO MEAS - RVOT AREA: 4.2 CM^2
BH CV ECHO MEAS - RVOT DIAM: 2.3 CM
BH CV ECHO MEAS - SI(AO): 116.5 ML/M^2
BH CV ECHO MEAS - SI(LVOT): 28.7 ML/M^2
BH CV ECHO MEAS - SI(MOD-SP2): 20.7 ML/M^2
BH CV ECHO MEAS - SI(MOD-SP4): 26.7 ML/M^2
BH CV ECHO MEAS - SV(AO): 253.3 ML
BH CV ECHO MEAS - SV(LVOT): 62.3 ML
BH CV ECHO MEAS - SV(MOD-SP2): 45 ML
BH CV ECHO MEAS - SV(MOD-SP4): 58 ML
BH CV ECHO MEAS - SV(RVOT): 46.1 ML
BH CV ECHO MEAS - TAPSE (>1.6): 2.8 CM
BH CV ECHO MEASUREMENTS AVERAGE E/E' RATIO: 4.63
BH CV XLRA - RV BASE: 3.38 CM
BH CV XLRA - TDI S': 11.7 CM/SEC
BILIRUB CONJ SERPL-MCNC: <0.2 MG/DL (ref 0.2–0.3)
BILIRUB INDIRECT SERPL-MCNC: ABNORMAL MG/DL
BILIRUB SERPL-MCNC: 0.4 MG/DL (ref 0.2–1.2)
BUN BLD-MCNC: 12 MG/DL (ref 6–20)
BUN/CREAT SERPL: 11.8 (ref 7–25)
CALCIUM SPEC-SCNC: 8.4 MG/DL (ref 8.6–10.5)
CHLORIDE SERPL-SCNC: 107 MMOL/L (ref 98–107)
CHOLEST SERPL-MCNC: 161 MG/DL (ref 0–200)
CO2 SERPL-SCNC: 23 MMOL/L (ref 22–29)
CREAT BLD-MCNC: 1.02 MG/DL (ref 0.76–1.27)
DEPRECATED RDW RBC AUTO: 38.8 FL (ref 37–54)
ERYTHROCYTE [DISTWIDTH] IN BLOOD BY AUTOMATED COUNT: 11.9 % (ref 12.3–15.4)
GFR SERPL CREATININE-BSD FRML MDRD: 78 ML/MIN/1.73
GLUCOSE BLD-MCNC: 94 MG/DL (ref 65–99)
HBA1C MFR BLD: 5.38 % (ref 4.8–5.6)
HCT VFR BLD AUTO: 38.4 % (ref 37.5–51)
HDLC SERPL-MCNC: 35 MG/DL (ref 40–60)
HGB BLD-MCNC: 13.6 G/DL (ref 13–17.7)
LDLC SERPL CALC-MCNC: 101 MG/DL (ref 0–100)
LDLC/HDLC SERPL: 2.89 {RATIO}
LEFT ATRIUM VOLUME INDEX: 17.1 ML/M2
LV EF 2D ECHO EST: 45 %
MAXIMAL PREDICTED HEART RATE: 171 BPM
MCH RBC QN AUTO: 32 PG (ref 26.6–33)
MCHC RBC AUTO-ENTMCNC: 35.4 G/DL (ref 31.5–35.7)
MCV RBC AUTO: 90.4 FL (ref 79–97)
PLATELET # BLD AUTO: 191 10*3/MM3 (ref 140–450)
PMV BLD AUTO: 10.4 FL (ref 6–12)
POTASSIUM BLD-SCNC: 4 MMOL/L (ref 3.5–5.2)
PROT SERPL-MCNC: 6.2 G/DL (ref 6–8.5)
RBC # BLD AUTO: 4.25 10*6/MM3 (ref 4.14–5.8)
SODIUM BLD-SCNC: 142 MMOL/L (ref 136–145)
STRESS TARGET HR: 145 BPM
TRIGL SERPL-MCNC: 124 MG/DL (ref 0–150)
VLDLC SERPL-MCNC: 24.8 MG/DL (ref 5–40)
WBC NRBC COR # BLD: 8.51 10*3/MM3 (ref 3.4–10.8)

## 2019-12-09 PROCEDURE — 80048 BASIC METABOLIC PNL TOTAL CA: CPT | Performed by: INTERNAL MEDICINE

## 2019-12-09 PROCEDURE — 93005 ELECTROCARDIOGRAM TRACING: CPT | Performed by: INTERNAL MEDICINE

## 2019-12-09 PROCEDURE — 93306 TTE W/DOPPLER COMPLETE: CPT

## 2019-12-09 PROCEDURE — 85027 COMPLETE CBC AUTOMATED: CPT | Performed by: INTERNAL MEDICINE

## 2019-12-09 PROCEDURE — 76705 ECHO EXAM OF ABDOMEN: CPT

## 2019-12-09 PROCEDURE — 80076 HEPATIC FUNCTION PANEL: CPT | Performed by: INTERNAL MEDICINE

## 2019-12-09 PROCEDURE — 80061 LIPID PANEL: CPT | Performed by: INTERNAL MEDICINE

## 2019-12-09 PROCEDURE — 93010 ELECTROCARDIOGRAM REPORT: CPT | Performed by: INTERNAL MEDICINE

## 2019-12-09 PROCEDURE — 83036 HEMOGLOBIN GLYCOSYLATED A1C: CPT | Performed by: INTERNAL MEDICINE

## 2019-12-09 PROCEDURE — 25010000002 PERFLUTREN (DEFINITY) 8.476 MG IN SODIUM CHLORIDE 0.9 % 10 ML INJECTION: Performed by: INTERNAL MEDICINE

## 2019-12-09 PROCEDURE — 99232 SBSQ HOSP IP/OBS MODERATE 35: CPT | Performed by: INTERNAL MEDICINE

## 2019-12-09 PROCEDURE — 93306 TTE W/DOPPLER COMPLETE: CPT | Performed by: INTERNAL MEDICINE

## 2019-12-09 RX ORDER — ASPIRIN 81 MG/1
81 TABLET ORAL DAILY
Status: DISCONTINUED | OUTPATIENT
Start: 2019-12-09 | End: 2019-12-10 | Stop reason: HOSPADM

## 2019-12-09 RX ADMIN — PERFLUTREN 2 ML: 6.52 INJECTION, SUSPENSION INTRAVENOUS at 08:30

## 2019-12-09 RX ADMIN — ATORVASTATIN CALCIUM 40 MG: 20 TABLET, FILM COATED ORAL at 20:23

## 2019-12-09 RX ADMIN — ASPIRIN 81 MG: 81 TABLET, COATED ORAL at 09:41

## 2019-12-09 RX ADMIN — METOPROLOL TARTRATE 12.5 MG: 25 TABLET ORAL at 20:23

## 2019-12-09 NOTE — PAYOR COMM NOTE
"Baron Bradly (49 y.o. Male)                     ATTENTION;    NURSE REVIEW, AUTH PENDING [id number], REPLY TO [facility] DEPT, VANI OROURKE N                    UR  [phone] , PATIENT ADMITTED TO CORONARY CARE UNIT         Date of Birth Social Security Number Address Home Phone MRN    1970  [address] [place] [place] [address] [phone] [id number]    Mandaen Marital Status          [employer]        Admission Date Admission Type Admitting Provider Attending Provider Department, Room/Bed    19 Emergency Erasto Linton MD Semder, Christopher A, MD [facility] CORONARY CARE, N333/1    Discharge Date Discharge Disposition Discharge Destination                       Attending Provider:  Erasto Linton MD    Allergies:  Morphine    Isolation:  None   Infection:  None   Code Status:  CPR    Ht:  182 cm (71.65\")   Wt:  96 kg (211 lb 10.3 oz)    Admission Cmt:  None   Principal Problem:  None                Active Insurance as of 2019     Primary Coverage     Payor Plan Insurance Group Employer/Plan Group    [employer] [id number]     Payor Plan Address Payor Plan Phone Number Payor Plan Fax Number Effective Dates    [address]   2019 - None Entered    [place] [phone]       Subscriber Name Subscriber Birth Date Member ID       BARON BRADLY 1970 [id number]                 Emergency Contacts      (Rel.) Home Phone Work Phone Mobile Phone    Vonda Abdullahi (Spouse) [phone] -- --               History & Physical      Erasto Linton MD at 19 1520          Date of Hospital Visit: 19  Encounter Provider: Erasto Linton MD  Place of Service: [facility] CARDIOLOGY  Patient Name: Baron Abdullahi  :1970        Chief complaint  Chest pain  Inferior STEMI    History of Present Illness  Very pleasant 49-year-old male with no " significant cardiac history and really only history of prior tobacco use who presented with the acute onset of central chest discomfort this afternoon.  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  He noticed the onset of chest pain that was at least moderate to severe in intensity and associated with diaphoresis.  It was central and did not radiate.  It was not associated with nausea or vomiting.  He denied any shortness of breath.  Secondary to his chest pain EMS was called.  He was noted of inferior ST elevation and he was life flighted here.    He was transferred to the cardiac catheterization lab urgently as an ST elevation MI.  He underwent coronary angiography with a subtotally occluded distal RCA with HALEY I flow distally.  He underwent PCI to the distal RCA with a 3.0 x 33 mm Xience Lainey drug-eluting stent, postdilated to 3.5 mm in the proximal segment and 3.25 mm in the distal segment.  Secondary to distal cut off of the RPDA also received intracoronary Integrilin.  His chest pain is still present however improving.  He still has residual ST elevation although also improving.    Past medical history:  Knee surgery: Orthopedic  Hearing loss unilateral      Current Meds  Scheduled Meds:  clopidogrel 600 mg Oral Once     Continuous Infusions:  nitroglycerin 10 mcg/min     PRN Meds:.sodium chloride    Allergies as of 12/08/2019 - Reviewed 12/08/2019   Allergen Reaction Noted   • Morphine Hives 12/08/2019          Family history  Family history of early myocardial infarction and bypass surgery in his father.    Social history  History of tobacco abuse.  No longer uses.    REVIEW OF SYSTEMS:   12 point ROS was performed and is negative except as outlined in HPI       Objective:   Heart Rate:  [107] 107  Resp:  [20] 20  BP: (126)/(87) 126/87  There is no height or weight on file to calculate BMI.    Vitals:    12/08/19 1514   BP: 126/87   Pulse: 107   Resp: 20   SpO2: 96%     Heart Rate:  [107]  107  Resp:  [20] 20  BP: (126)/(87) 126/87  No intake or output data in the 24 hours ending 12/08/19 1523      General Appearance:    Alert, cooperative, in no acute distress   Head:    Normocephalic, without obvious abnormality, atraumatic       Neck:   No adenopathy, supple, no thyromegaly, no carotid bruit, no    JVD   Lungs:     Clear to auscultation bilaterally, no wheezes, rales, or     rhonchi    Heart:    Normal rate, regular rhythm, no murmur, no rub, no gallop   Chest Wall:    No abnormalities observed   Abdomen:     Normal bowel sounds, soft, nontender, nondistended,            no rebound tenderness   Extremities:   No cyanosis, clubbing, or edema   Pulses:   Pulses palpable and equal bilaterally   Skin:   No bleeding or rash       Neurologic:   Cranial nerves 2 - 12 grossly intact, sensation intact           All labs have been reviewed.  CBC and BMP within normal limits.  Troponin negative on arrival.    EKG reviewed via [facility].  Inferior ST elevation consistent with injury      Assessment and Plan:  1.  Inferior STEMI  2.  Coronary artery disease: Subtotally occluded RCA.  Discrete 60% mid circumflex coronary artery stenosis.  Diffuse 30% mid vessel LAD stenosis  3.  History of tobacco abuse  4.  Elevated alkaline phosphatase      -Continue dual antiplatelet therapy at least at this time with aspirin and Plavix as he does have Integrilin on board.  I will plan on moving to [facility].  -Start low-dose metoprolol tartrate  -Atorvastatin 40 mg nightly with plan to increase to 80 mg if lipid panel looks bad.  -Routine transthoracic echocardiogram  -Elevated alkaline phosphatase I do think we probably should have a a right upper quadrant ultrasound and consider GI evaluation as well.  I will see what the ultrasound shows us.    Erasto Linton MD  12/08/19  3:20 PM.  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 Patient given meds in route     Flora Ferris RN  19 1539      Electronically signed by Flora Ferris RN at 19 1539     Gerard Melchor MD at 19 1515           EMERGENCY DEPARTMENT ENCOUNTER    CHIEF COMPLAINT  Chief Complaint: Chest pain  History given by: Patient, EMS  History limited by: None  Room Number: SERAFIN/SERAFIN  PMD: System, Provider Not In      HPI:  Pt is a 49 y.o. 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 Patient was transported by [facility] who activated the cath lab. Patient has stopped in the ED briefly while the cath lab is being prepared. Per EMS, the patient became pale, diaphoretic and nauseated at the onset of his pain. Patient's initial BP was in the 90s systolically, but improved after fluids were administered. Per EMS, the patient was given 4 mg Zofran and he was started on IV nitro en route. Patient has no known cardiac history, but reports his father had coronary artery bypass surgery in his early 50s.     Duration/Onset/Timin/gradual/constant  Location: substernal chest  Radiation: none  Quality: pain  Intensity/Severity: severe  Associated Symptoms: SOA, nausea, pallor, diaphoresis  Aggravating or Alleviating Factors: none  Previous Episodes: no      PAST MEDICAL HISTORY  Active Ambulatory Problems     Diagnosis Date Noted   • No Active Ambulatory Problems     Resolved Ambulatory Problems     Diagnosis Date Noted   • No Resolved Ambulatory Problems     No Additional Past Medical History       PAST SURGICAL HISTORY  No past surgical history on file.    FAMILY HISTORY  No family history on file.    SOCIAL HISTORY  Social History     Socioeconomic History    • Marital status: Unknown     Spouse name: Not on file   • Number of children: Not on file   • Years of education: Not on file   • Highest education level: Not on file       ALLERGIES  Morphine    REVIEW OF SYSTEMS  Review of Systems   Constitutional: Positive for diaphoresis. Negative for activity change, appetite change and fever.   HENT: Negative for congestion and sore throat.    Eyes: Negative.    Respiratory: Positive for shortness of breath. Negative for cough.    Cardiovascular: Positive for chest pain. Negative for leg swelling.   Gastrointestinal: Positive for nausea. Negative for abdominal pain, diarrhea and vomiting.   Endocrine: Negative.    Genitourinary: Negative for decreased urine volume and dysuria.   Musculoskeletal: Negative for neck pain.   Skin: Positive for pallor. Negative for rash and wound.   Allergic/Immunologic: Negative.    Neurological: Negative for weakness, numbness and headaches.   Hematological: Negative.    Psychiatric/Behavioral: Negative.    All other systems reviewed and are negative.      PHYSICAL EXAM  ED Triage Vitals [12/08/19 1514]   Temp Heart Rate Resp BP SpO2   -- 107 20 126/87 96 %      Temp src Heart Rate Source Patient Position BP Location FiO2 (%)   -- -- -- -- --       Physical Exam   Constitutional: He is oriented to person, place, and time. He appears distressed (moderate).   HENT:   Head: Normocephalic and atraumatic.   Mouth/Throat: Mucous membranes are dry.   Eyes: Pupils are equal, round, and reactive to light. EOM are normal.   Neck: Normal range of motion. Neck supple.   Cardiovascular: Normal rate, regular rhythm and normal heart sounds. Exam reveals no gallop and no friction rub.   No murmur heard.  Pulmonary/Chest: Effort normal and breath sounds normal. No respiratory distress. He has no wheezes. He has no rales.   Abdominal: Soft. There is no tenderness. There is no rebound and no guarding.   Musculoskeletal: Normal range of motion. He exhibits no  edema.   Neurological: He is alert and oriented to person, place, and time. He has normal sensation and normal strength.   Skin: Skin is warm. He is diaphoretic. There is pallor.   Nursing note and vitals reviewed.      LAB RESULTS  Lab Results (last 24 hours)     Procedure Component Value Units Date/Time    CBC & Differential [[id number]] Collected:  12/08/19 1519    Specimen:  Blood Updated:  12/08/19 1525    Narrative:       The following orders were created for panel order CBC & Differential.  Procedure                               Abnormality         Status                     ---------                               -----------         ------                     CBC Auto Differential[[id number]]        Abnormal            Final result                 Please view results for these tests on the individual orders.    Troponin [[id number]]  (Normal) Collected:  12/08/19 1519    Specimen:  Blood Updated:  12/08/19 1543     Troponin T <0.010 ng/mL     Narrative:       Troponin T Reference Range:  <= 0.03 ng/mL-   Negative for AMI  >0.03 ng/mL-     Abnormal for myocardial necrosis.  Clinicians would have to utilize clinical acumen, EKG, Troponin and serial changes to determine if it is an Acute Myocardial Infarction or myocardial injury due to an underlying chronic condition.     Comprehensive Metabolic Panel [[id number]]  (Abnormal) Collected:  12/08/19 1519    Specimen:  Blood Updated:  12/08/19 1543     Glucose 131 mg/dL      BUN 15 mg/dL      Creatinine 1.21 mg/dL      Sodium 143 mmol/L      Potassium 4.5 mmol/L      Chloride 107 mmol/L      CO2 24.7 mmol/L      Calcium 8.8 mg/dL      Total Protein 6.5 g/dL      Albumin 4.00 g/dL      ALT (SGPT) 26 U/L      AST (SGOT) 21 U/L      Alkaline Phosphatase 268 U/L      Total Bilirubin 0.3 mg/dL      eGFR Non African Amer 64 mL/min/1.73      Globulin 2.5 gm/dL      A/G Ratio 1.6 g/dL      BUN/Creatinine Ratio 12.4     Anion Gap 11.3 mmol/L     Narrative:       GFR Normal  >60  Chronic Kidney Disease <60  Kidney Failure <15      Magnesium [[id number]]  (Normal) Collected:  12/08/19 1519    Specimen:  Blood Updated:  12/08/19 1543     Magnesium 2.0 mg/dL     Protime-INR [[id number]]  (Normal) Collected:  12/08/19 1519    Specimen:  Blood Updated:  12/08/19 1534     Protime 13.7 Seconds      INR 1.08    CBC Auto Differential [[id number]]  (Abnormal) Collected:  12/08/19 1519    Specimen:  Blood Updated:  12/08/19 1525     WBC 6.33 10*3/mm3      RBC 4.48 10*6/mm3      Hemoglobin 14.8 g/dL      Hematocrit 41.2 %      MCV 92.0 fL      MCH 33.0 pg      MCHC 35.9 g/dL      RDW 11.8 %      RDW-SD 39.6 fl      MPV 10.1 fL      Platelets 213 10*3/mm3      Neutrophil % 54.9 %      Lymphocyte % 29.5 %      Monocyte % 9.3 %      Eosinophil % 4.4 %      Basophil % 1.1 %      Immature Grans % 0.8 %      Neutrophils, Absolute 3.47 10*3/mm3      Lymphocytes, Absolute 1.87 10*3/mm3      Monocytes, Absolute 0.59 10*3/mm3      Eosinophils, Absolute 0.28 10*3/mm3      Basophils, Absolute 0.07 10*3/mm3      Immature Grans, Absolute 0.05 10*3/mm3      nRBC 0.0 /100 WBC           I ordered the above labs and reviewed the results    RADIOLOGY  XR Chest 1 View    (Results Pending)        I ordered the above noted radiological studies. Interpreted by radiologist. Reviewed by me in PACS.       PROCEDURES  Procedures    Pre-hospital EKG    Acute inferior ST elevation with reciprocal changes anteriorly consistent with an acute MI    No previous for comparison    Interpreted Contemporaneously by me.  Independently viewed by me.      PROGRESS AND CONSULTS     1515 Labs ordered for evaluation. CXR ordered for evaluation.    1520 Dr. Linton is in the patient's room at this time. He will take the patient to the cath lab for further treatment.      MEDICAL DECISION MAKING  Results were reviewed/discussed with the patient and they were also made aware of online access. Pt also made aware that some labs, such as cultures,  will not be resulted during ER visit and follow up with PMD is necessary.     MDM  Number of Diagnoses or Management Options  Acute MI, inferior wall (CMS/HCC):      Amount and/or Complexity of Data Reviewed  Clinical lab tests: ordered and reviewed  Tests in the radiology section of CPT®:  ordered and reviewed  Tests in the medicine section of CPT®:  ordered and reviewed (See procedure note for EKG.)  Decide to obtain previous medical records or to obtain history from someone other than the patient: yes (Epic)  Obtain history from someone other than the patient: yes (EMS)  Review and summarize past medical records: yes  Discuss the patient with other providers: yes (Dr. Royer MD)  Independent visualization of images, tracings, or specimens: yes    Patient Progress  Patient progress: stable         DIAGNOSIS  Final diagnoses:   Acute MI, inferior wall (CMS/HCC)       DISPOSITION  ADMISSION TO CATH LAB    Discussed treatment plan and reason for admission with pt/family and admitting physician.  Pt/family voiced understanding of the plan for admission for further testing/treatment as needed.       Latest Documented Vital Signs:  As of 3:51 PM  BP- 126/87 HR- 107 Temp-   O2 sat- 96%    --  Documentation assistance provided by ankit Sanchez for Dr. Jill MD.  Information recorded by the scribe was done at my direction and has been verified and validated by me.       Adeline Sanchez  12/08/19 1551       Gerard Melchor MD  12/08/19 2237      Electronically signed by Gerard Melchor MD at 12/08/19 2237       Vital Signs (last day)     Date/Time   Temp   Temp src   Pulse   Resp   BP   Patient Position   SpO2    12/09/19 1132   98.2 (36.8)   Oral   --   --   --   --   --    12/09/19 1003   --   --   --   --   124/86   --   --    12/09/19 0700   97.5 (36.4)   Oral   56   --   122/81   --   --    12/09/19 0600   --   --   54   --   111/75   --   93    12/09/19 0515   98.2 (36.8)   Oral   --   --   --   --   --     12/09/19 0500   --   --   56   --   127/75   --   96    12/09/19 0400   --   --   61   --   128/71   --   93    12/09/19 0300   --   --   67   --   126/87   --   96    12/09/19 0200   --   --   60   --   114/75   --   93    12/09/19 0100   --   --   65   --   115/76   --   96    12/09/19 0044   98.5 (36.9)   Oral   --   --   --   --   --    12/09/19 0000   --   --   60   --   122/72   --   95    12/08/19 2300   --   --   61   --   115/73   --   96    12/08/19 2200   --   --   66   --   117/75   --   95    12/08/19 2016   --   --   70   --   117/83   --   --    12/08/19 1930   --   --   79   --   144/86   --   97    12/08/19 1915   --   --   76   --   141/92   --   97    12/08/19 1900   --   --   81   --   138/93   --   97    12/08/19 1845   --   --   79   --   132/87   --   97    12/08/19 1830   --   --   82   --   125/85   --   97    12/08/19 1815   --   --   82   --   126/81   --   98    12/08/19 1800   --   --   81   --   123/83   --   98    12/08/19 1745   --   --   90   --   (!) 139/123   --   98    12/08/19 1730   --   --   87   --   136/89   --   98    12/08/19 1715   --   --   92   --   128/88   --   98    12/08/19 1700   --   --   96   --   --   --   95    12/08/19 1651   98 (36.7)   Oral   96   16   124/75   --   98    12/08/19 1645   --   --   88   --   124/75   --   95    12/08/19 16:12:14   --   --   --   16   --   --   --    12/08/19 16:07:37   --   --   --   16   --   --   --    12/08/19 16:02:15   --   --   --   16   --   --   --    12/08/19 15:56:50   --   --   --   16   --   --   --    12/08/19 15:51:50   --   --   --   18   --   --   --    12/08/19 15:46:50   --   --   --   18   --   --   --    12/08/19 15:46:14   --   --   --   16   --   --   --    12/08/19 15:41:18   --   --   --   19   --   --   --    12/08/19 15:36:33   --   --   --   18   --   --   --    12/08/19 15:31:40   --   --   --   18   --   --   --    12/08/19 15:26:16   --   --   --   18   --   --   --    12/08/19 1514   --   --    107   20   126/87   --   96              Oxygen Therapy (last day)     Date/Time   SpO2   Device (Oxygen Therapy)   Flow (L/min)   Oxygen Concentration (%)   ETCO2 (mmHg)    12/09/19 0600   93   --   --   --   --    12/09/19 0500   96   --   --   --   --    12/09/19 0400   93   room air   --   --   --    12/09/19 0300   96   --   --   --   --    12/09/19 0200   93   --   --   --   --    12/09/19 0100   96   --   --   --   --    12/09/19 0000   95   --   --   --   --    12/08/19 2300   96   --   --   --   --    12/08/19 2200   95   --   --   --   --    12/08/19 2000   --   room air   --   --   --    12/08/19 1930   97   --   --   --   --    12/08/19 1915   97   --   --   --   --    12/08/19 1900   97   --   --   --   --    12/08/19 1845   97   --   --   --   --    12/08/19 1830   97   --   --   --   --    12/08/19 1815   98   --   --   --   --    12/08/19 1800   98   --   --   --   --    12/08/19 1745   98   --   --   --   --    12/08/19 1730   98   --   --   --   --    12/08/19 1720   --   room air   --   --   --    12/08/19 1715   98   --   --   --   --    12/08/19 1700   95   --   --   --   --    12/08/19 1651   98   room air   --   --   --    12/08/19 1645   95   --   --   --   --    12/08/19 1514   96   --   --   --   --              Lines, Drains & Airways    Active LDAs     Name:   Placement date:   Placement time:   Site:   Days:    Peripheral IV 12/08/19 Right Antecubital   12/08/19    --    Antecubital   1    Peripheral IV 12/08/19 Left Antecubital   12/08/19    --    Antecubital   1         Inactive LDAs     Name:   Placement date:   Placement time:   Removal date:   Removal time:   Site:   Days:    [REMOVED] Arterial Sheath 6 Fr. Right Radial   12/08/19    --    12/08/19    1413    Radial   less than 1                Hospital Medications (all)       Dose Frequency Start End    acetaminophen (TYLENOL) tablet 650 mg 650 mg Every 4 Hours PRN 12/8/2019     Route: Oral    aspirin EC tablet 81 mg 81 mg Daily  "12/9/2019     Admin Instructions: Herbal/drug interaction: Avoid use with ginkgo biloba. Do not crush or chew.<BR>Do not exceed 4 grams of aspirin in a 24 hr period.<BR><BR>If given for pain, use the following pain scale: <BR>Mild Pain = Pain Score of 1-3, CPOT 1-2<BR>Moderate Pain = Pain Score of 4-6, CPOT 3-4<BR>Severe Pain = Pain Score of 7-10, CPOT 5-8    Route: Oral    atorvastatin (LIPITOR) tablet 40 mg 40 mg Nightly 12/8/2019     Admin Instructions: Avoid grapefruit juice.    Route: Oral    clopidogrel (PLAVIX) tablet (Completed)  Code / Trauma / Sedation Medication 12/8/2019 12/8/2019    Route: Oral    enoxaparin (LOVENOX) syringe 40 mg 40 mg Every 24 Hours 12/10/2019     Admin Instructions: Give subcutaneous in abdomen only. Do not massage site after injection.    Route: Subcutaneous    heparin (porcine) 5000 UNIT/ML injection (Completed)  Code / Trauma / Sedation Medication 12/8/2019 12/8/2019    Route: Intravenous    HYDROcodone-acetaminophen (NORCO) 5-325 MG per tablet 1 tablet 1 tablet Every 4 Hours PRN 12/8/2019     Route: Oral    metoprolol tartrate (LOPRESSOR) tablet 12.5 mg 12.5 mg Every 12 Hours Scheduled 12/9/2019     Admin Instructions: Hold for SBP <100 or HR <60. Give with food.    Route: Oral    nitroglycerin 50 mg/250 mL (0.2 mg/mL) infusion (Completed)  Code / Trauma / Sedation Continuous Med 12/8/2019 12/8/2019    Route: Intravenous    ondansetron (ZOFRAN) injection 4 mg 4 mg Every 6 Hours PRN 12/8/2019     Route: Intravenous    Linked Group 1:  \"Or\" Linked Group Details        ondansetron (ZOFRAN) tablet 4 mg 4 mg Every 6 Hours PRN 12/8/2019     Route: Oral    Linked Group 1:  \"Or\" Linked Group Details        perflutren (DEFINITY) 8.476 mg in sodium chloride 0.9 % 10 mL injection (Completed) 2 mL Once 12/9/2019 12/9/2019    Admin Instructions: Mix 1.3 mL of mechanically activated Definity with 8.7 mL of normal saline. A total of 2 mL of the resulting Definity solution was administered.    " Route: Intravenous    prasugrel (EFFIENT) tablet 10 mg 10 mg Daily 12/10/2019     Admin Instructions: May crush tablet and mix in water and administer immediately via a gastric tube. DO NOT ADMINISTER if patient has a history of stroke or TIA - contact prescriber.    Route: Oral    prasugrel (EFFIENT) tablet 60 mg 60 mg Once 2019     Admin Instructions: Leaving for clinical on Monday am... 60mg DAILY exceeds daily dose by 500 % .. Will ask clinical to contact MD<BR>May crush tablet and mix in water and administer immediately via a gastric tube. DO NOT ADMINISTER if patient has a history of stroke or TIA - contact prescriber.    Route: Oral    Cosign for Ordering: Accepted by Erasto Linton MD on 2019  5:21 PM    sodium chloride 0.9 % flush 10 mL 10 mL As Needed 2019     Route: Intravenous    sodium chloride 0.9 % infusion (Completed)  Continuous PRN 2019    sodium chloride 0.9 % infusion () 50 mL/hr Continuous 2019    Route: Intravenous    temazepam (RESTORIL) capsule 15 mg 15 mg Nightly PRN 2019     Admin Instructions:     Route: Oral    atropine injection (Discontinued)  As Needed 2019    Reason for Discontinue: Patient Discharge    BH (CUPID ONLY) ADENOSINE 6 MG/100ML MIXTURE injection (Discontinued)  As Needed 2019    Reason for Discontinue: Patient Discharge    clopidogrel (PLAVIX) tablet 600 mg (Discontinued) 600 mg Once 2019    Route: Oral    eptifibatide (INTEGRILIN) bolus from bottle (Discontinued)  As Needed 2019    Reason for Discontinue: Patient Discharge    heparin (porcine) injection (Discontinued)  As Needed 2019    Reason for Discontinue: Patient Discharge    iopamidol (ISOVUE-370) 76 % injection (Discontinued)  As Needed 2019    Reason for Discontinue: Patient Discharge    lidocaine (XYLOCAINE) 2% injection (Discontinued)  As Needed 2019  "12/8/2019    Reason for Discontinue: Patient Discharge    metoprolol tartrate (LOPRESSOR) injection (Discontinued)  As Needed 12/8/2019 12/8/2019    Reason for Discontinue: Patient Discharge    metoprolol tartrate (LOPRESSOR) tablet 25 mg (Discontinued) 25 mg Every 12 Hours Scheduled 12/8/2019 12/9/2019    Admin Instructions: Hold for SBP <100 or HR <60. Give with food.    Route: Oral    midazolam (VERSED) injection (Discontinued)  As Needed 12/8/2019 12/8/2019    Reason for Discontinue: Patient Discharge    morphine injection 2 mg (Discontinued) 2 mg Every 2 Hours PRN 12/8/2019 12/8/2019    Admin Instructions: If given for pain, use the following pain scale:<BR>Mild Pain = Pain Score of 1-3, CPOT 1-2<BR>Moderate Pain = Pain Score of 4-6, CPOT 3-4<BR>Severe Pain = Pain Score of 7-10, CPOT 5-8    Route: Intravenous    Linked Group 2:  \"And\" Linked Group Details        naloxone (NARCAN) injection 0.4 mg (Discontinued) 0.4 mg Every 5 Minutes PRN 12/8/2019 12/8/2019    Admin Instructions: If respiratory rate is less than 8 breaths/minute or patient is difficult to arouse stop any narcotics and contact physician. <BR>Administer slow IV push. Repeat as ordered until patient's respiratory rate is greater than 12 breaths/minute.    Route: Intravenous    Linked Group 2:  \"And\" Linked Group Details        nitroGLYCERIN 200 mcg/mL 30 mL syringe (Discontinued)  As Needed 12/8/2019 12/8/2019    Reason for Discontinue: Patient Discharge    nitroglycerin 50 mg/250 mL (0.2 mg/mL) infusion (Discontinued) 10 mcg/min Titrated 12/8/2019 12/8/2019    Admin Instructions: Administration instructions for Chest Pain - Initiate at 10 mcg/min - Increase 5 mcg/min Every 5 Minutes to Maintain Chest Pain Free.  If Pain Recurs or Dose Greater Than 50 mcg/min Contact Provider. Hold for SBP Less Than 90..<BR><BR>    Route: Intravenous    ondansetron (ZOFRAN) injection (Discontinued)  As Needed 12/8/2019 12/8/2019    Reason for Discontinue: Patient " Discharge    phenylephrine (IRINA-SYNEPHRINE) injection (Discontinued)  As Needed 12/8/2019 12/8/2019    Reason for Discontinue: Patient Discharge    prasugrel (EFFIENT) tablet 60 mg (Discontinued) 60 mg Daily 12/9/2019 12/8/2019    Admin Instructions: May crush tablet and mix in water and administer immediately via a gastric tube. DO NOT ADMINISTER if patient has a history of stroke or TIA - contact prescriber.    Route: Oral    verapamil (ISOPTIN) injection (Discontinued)  As Needed 12/8/2019 12/8/2019    Reason for Discontinue: Patient Discharge          Orders (last 24 hrs)      Start     Ordered    12/10/19 0900  prasugrel (EFFIENT) tablet 10 mg  Daily      12/08/19 1619    12/10/19 0600  enoxaparin (LOVENOX) syringe 40 mg  Every 24 Hours      12/09/19 0812    12/09/19 2100  metoprolol tartrate (LOPRESSOR) tablet 12.5 mg  Every 12 Hours Scheduled      12/09/19 0937    12/09/19 1407  Inpatient Gastroenterology Consult  Once     Specialty:  Gastroenterology  Provider:  Keanu Macias MD    12/09/19 1409    12/09/19 0900  prasugrel (EFFIENT) tablet 60 mg  Daily,   Status:  Discontinued      12/08/19 1654    12/09/19 0900  aspirin EC tablet 81 mg  Daily      12/09/19 0813    12/09/19 0830  perflutren (DEFINITY) 8.476 mg in sodium chloride 0.9 % 10 mL injection  Once      12/09/19 1005    12/09/19 0822  Transfer Patient  Once      12/09/19 0822    12/09/19 0815  US Gallbladder  1 Time Imaging      12/09/19 0814    12/09/19 0813  Hepatic Function Panel  Once      12/09/19 0812    12/09/19 0700  ECG 12 Lead  Once      12/08/19 1654    12/09/19 0600  CBC (No Diff)  Morning Draw      12/08/19 1654    12/09/19 0600  Basic Metabolic Panel  Morning Draw      12/08/19 1654    12/09/19 0600  Hemoglobin A1c  Morning Draw      12/08/19 1654    12/09/19 0600  Lipid Panel  Morning Draw     Comments:  Fasting      12/08/19 1654    12/09/19 0039  POC Activated Clotting Time  Once      12/08/19 1544    12/09/19 0039  POC Activated  Clotting Time  Once      12/08/19 1555    12/09/19 0038  POC Activated Clotting Time  Once      12/08/19 1600    12/09/19 0038  POC Activated Clotting Time  Once      12/08/19 1611    12/08/19 2100  atorvastatin (LIPITOR) tablet 40 mg  Nightly      12/08/19 1654    12/08/19 2100  metoprolol tartrate (LOPRESSOR) tablet 25 mg  Every 12 Hours Scheduled,   Status:  Discontinued      12/08/19 1654    12/08/19 2000  Strict intake and output  Every 4 Hours      12/08/19 1654    12/08/19 1815  prasugrel (EFFIENT) tablet 60 mg  Once      12/08/19 1720    12/08/19 1800  Incentive Spirometry  Every 2 Hours While Awake      12/08/19 1654    12/08/19 1656  sodium chloride 0.9 % infusion  Continuous      12/08/19 1654    12/08/19 1655  Code Status and Medical Interventions:  Continuous      12/08/19 1654    12/08/19 1655  Obtain STAT EKG during chest pain. Notify MD of any change in rhythm, ST segments or complaints of chest pain.  Until Discontinued      12/08/19 1654    12/08/19 1655  Encourage fluids  Until Discontinued      12/08/19 1654    12/08/19 1655  Verify Discontinuation of enoxaparin (LOVENOX) and / or heparin  Once      12/08/19 1654    12/08/19 1655  If patient on Metformin (Glucophage) hold for 48 hours.  Until Discontinued      12/08/19 1654    12/08/19 1655  Notify MD if platelet count is less than 100,000, is less than 1/2 baseline, or if Hgb drops by more than 3mg/dl.  Until Discontinued      12/08/19 1654    12/08/19 1655  Notify MD of hypotension (SBP less than 95), bleeding, or dysrythmia and follow Sheath Removal Policy if needed.  Until Discontinued      12/08/19 1654    12/08/19 1655  Oxygen Therapy- Nasal Cannula; Titrate for SPO2: equal to or greater than, 92%, per policy  Continuous      12/08/19 1654    12/08/19 1655  Continuous Pulse Oximetry  Continuous      12/08/19 1654    12/08/19 1655  Diet Regular; Consistent Carbohydrate, Cardiac  Diet Effective Now      12/08/19 1654    12/08/19 1655  Advance  diet as tolerated  Until Discontinued      12/08/19 1654    12/08/19 1655  ECG 12 Lead  STAT      12/08/19 1654    12/08/19 1655  Echocardiogram 2D complete  Once      12/08/19 1654    12/08/19 1655  Troponin  STAT      12/08/19 1654    12/08/19 1655  Remove Radial Pressure Device / Dressing  Once      12/08/19 1654    12/08/19 1655  Vital Signs & Reverse Tito's Test (While Radial Compression Device in Place)  Per Order Details     Comments:  Every 15 Minutes x4, Every 30 Minutes x4, & Every 1 Hour x2    12/08/19 1654    12/08/19 1655  Keep Affected Arm Straight & Elevated  Continuous      12/08/19 1654    12/08/19 1655  Activity As Tolerated  Until Discontinued      12/08/19 1654    12/08/19 1654  acetaminophen (TYLENOL) tablet 650 mg  Every 4 Hours PRN      12/08/19 1654    12/08/19 1654  HYDROcodone-acetaminophen (NORCO) 5-325 MG per tablet 1 tablet  Every 4 Hours PRN      12/08/19 1654    12/08/19 1654  morphine injection 2 mg  Every 2 Hours PRN,   Status:  Discontinued      12/08/19 1654    12/08/19 1654  naloxone (NARCAN) injection 0.4 mg  Every 5 Minutes PRN,   Status:  Discontinued      12/08/19 1654    12/08/19 1654  temazepam (RESTORIL) capsule 15 mg  Nightly PRN      12/08/19 1654    12/08/19 1654  ondansetron (ZOFRAN) tablet 4 mg  Every 6 Hours PRN      12/08/19 1654    12/08/19 1654  ondansetron (ZOFRAN) injection 4 mg  Every 6 Hours PRN      12/08/19 1654    12/08/19 1654  POC Glucose Once  Once      12/08/19 1649    12/08/19 1616  Inpatient Admission  Once      12/08/19 1619    12/08/19 1610  iopamidol (ISOVUE-370) 76 % injection  As Needed,   Status:  Discontinued      12/08/19 1610    12/08/19 1554  eptifibatide (INTEGRILIN) bolus from bottle  As Needed,   Status:  Discontinued      12/08/19 1554    12/08/19 1553  metoprolol tartrate (LOPRESSOR) injection  As Needed,   Status:  Discontinued      12/08/19 1553    12/08/19 1547  BH (CUPID ONLY) ADENOSINE 6 MG/100ML MIXTURE injection  As Needed,    Status:  Discontinued      12/08/19 1547    12/08/19 1544  atropine injection  As Needed,   Status:  Discontinued      12/08/19 1545    12/08/19 1542  phenylephrine (IRINA-SYNEPHRINE) injection  As Needed,   Status:  Discontinued      12/08/19 1542    12/08/19 1541  heparin (porcine) injection  As Needed,   Status:  Discontinued      12/08/19 1542    12/08/19 1539  ondansetron (ZOFRAN) injection  As Needed,   Status:  Discontinued      12/08/19 1539    12/08/19 1532  nitroGLYCERIN 200 mcg/mL 30 mL syringe  As Needed,   Status:  Discontinued      12/08/19 1535    12/08/19 1531  verapamil (ISOPTIN) injection  As Needed,   Status:  Discontinued      12/08/19 1535    12/08/19 1530  lidocaine (XYLOCAINE) 2% injection  As Needed,   Status:  Discontinued      12/08/19 1530    12/08/19 1530  sodium chloride 0.9 % infusion  Continuous PRN      12/08/19 1533    12/08/19 1529  midazolam (VERSED) injection  As Needed,   Status:  Discontinued      12/08/19 1530    12/08/19 1518  nitroglycerin 50 mg/250 mL (0.2 mg/mL) infusion  Titrated,   Status:  Discontinued      12/08/19 1516    12/08/19 1518  Cardiac Catheterization/Vascular Study  Once      12/08/19 1517    12/08/19 1517  clopidogrel (PLAVIX) tablet 600 mg  Once,   Status:  Discontinued      12/08/19 1515    12/08/19 1515  Initiate Department's Acute STEMI Process  Once      12/08/19 1515    12/08/19 1515  Consult Interventional Cardiology and Notify Cath Lab  Once     Specialty:  Interventional Cardiology  Provider:  (Not yet assigned)    12/08/19 1515    12/08/19 1515  NPO Diet  Diet Effective Now,   Status:  Canceled      12/08/19 1515    12/08/19 1515  Undress and Gown  Once      12/08/19 1515    12/08/19 1515  Cardiac monitoring  Per Hospital Policy      12/08/19 1515    12/08/19 1515  Continuous Pulse Oximetry  Continuous,   Status:  Canceled      12/08/19 1515    12/08/19 1515  Vital Signs  Per Hospital Policy     Comments:  Every 5-15 mninutes    12/08/19 1515     12/08/19 1515  ECG 12 Lead  Once,   Status:  Canceled      12/08/19 1515    12/08/19 1515  XR Chest 1 View  1 Time Imaging,   Status:  Canceled      12/08/19 1515    12/08/19 1515  Insert peripheral IV - avoid Rt radial if possible  Once      12/08/19 1515    12/08/19 1515  Insert 2nd peripheral IV - avoid Rt radial if possible  Once      12/08/19 1515    12/08/19 1515  [place] Draw  Once      12/08/19 1515    12/08/19 1515  CBC & Differential  Once      12/08/19 1515    12/08/19 1515  Troponin  Once      12/08/19 1515    12/08/19 1515  Comprehensive Metabolic Panel  Once      12/08/19 1515    12/08/19 1515  Magnesium  Once      12/08/19 1515    12/08/19 1515  Protime-INR  Once      12/08/19 1515    12/08/19 1515  Light Blue Top  PROCEDURE ONCE      12/08/19 1515    12/08/19 1515  Green Top (Gel)  PROCEDURE ONCE      12/08/19 1515    12/08/19 1515  Lavender Top  PROCEDURE ONCE      12/08/19 1515    12/08/19 1515  Gold Top - SST  PROCEDURE ONCE      12/08/19 1515    12/08/19 1515  CBC Auto Differential  PROCEDURE ONCE      12/08/19 1515    12/08/19 1515  heparin (porcine) 5000 UNIT/ML injection  Code / Trauma / Sedation Medication      12/08/19 1534    12/08/19 1515  nitroglycerin 50 mg/250 mL (0.2 mg/mL) infusion  Code / Trauma / Sedation Continuous Med      12/08/19 1535    12/08/19 1514  Oxygen Therapy- Nasal Cannula; 2 LPM; Titrate for SPO2: equal to or greater than, 92%  Continuous PRN,   Status:  Canceled      12/08/19 1515    12/08/19 1514  sodium chloride 0.9 % flush 10 mL  As Needed      12/08/19 1515    12/08/19 1514  ECG 12 Lead  Once,   Status:  Canceled      12/08/19 1514    12/08/19 1514  clopidogrel (PLAVIX) tablet  Code / Trauma / Sedation Medication      12/08/19 1533    12/08/19 0000  Ambulatory Referral to Cardiac Rehab      12/08/19 1619    Unscheduled  Vital Signs  As Needed      12/08/19 1654    Unscheduled  Change site dressing  As Needed      12/08/19 1654    Pending  heparin (porcine) 5000  "UNIT/ML injection 5,000 Units  Once      Pending    --  aspirin 81 MG chewable tablet  Daily      12/08/19 1705    --  probenecid (BENEMID) 500 MG tablet  2 Times Daily      12/08/19 1709                   Physician Progress Notes (last 24 hours)      Erasto Linton MD at 12/09/19 0811          Patient Care Team:  Storm Murguia MD as PCP - General (Family Medicine)    Chief Complaint: Follow-up inferior ST elevation MI    Interval History:   Doing well.  No chest pain.  EKG improving.    Objective   Vital Signs  Temp:  [97.5 °F (36.4 °C)-98.5 °F (36.9 °C)] 97.5 °F (36.4 °C)  Heart Rate:  [] 56  Resp:  [16-20] 16  BP: (111-144)/() 122/81    Intake/Output Summary (Last 24 hours) at 12/9/2019 0811  Last data filed at 12/9/2019 0500  Gross per 24 hour   Intake 1000 ml   Output 400 ml   Net 600 ml     Flowsheet Rows      First Filed Value   Admission Height  182.9 cm (72.01\") Documented at 12/08/2019 1514   Admission Weight  95.3 kg (210 lb) Documented at 12/08/2019 1514          General Appearance:    Alert, cooperative, in no acute distress   Head:    Normocephalic, without obvious abnormality, atraumatic       Neck:   No adenopathy, supple, no thyromegaly, no carotid bruit, no    JVD   Lungs:     Clear to auscultation bilaterally, no wheezes, rales, or     rhonchi    Heart:    Normal rate, regular rhythm, no murmur, no rub, no gallop   Chest Wall:    No abnormalities observed   Abdomen:     Normal bowel sounds, soft, nontender, nondistended,            no rebound tenderness   Extremities:   No cyanosis, clubbing, or edema   Pulses:   Pulses palpable and equal bilaterally   Skin:   No bleeding or rash       Neurologic:   Cranial nerves 2 - 12 grossly intact, sensation intact             atorvastatin 40 mg Oral Nightly   metoprolol tartrate 25 mg Oral Q12H   [START ON 12/10/2019] prasugrel 10 mg Oral Daily   prasugrel 60 mg Oral Once            Results Review:    Results from last 7 days   Lab " Units 12/09/19  0504   SODIUM mmol/L 142   POTASSIUM mmol/L 4.0   CHLORIDE mmol/L 107   CO2 mmol/L 23.0   BUN mg/dL 12   CREATININE mg/dL 1.02   GLUCOSE mg/dL 94   CALCIUM mg/dL 8.4*     Results from last 7 days   Lab Units 12/08/19  1806 12/08/19  1519   TROPONIN T ng/mL 0.627* <0.010     Results from last 7 days   Lab Units 12/09/19  0504   WBC 10*3/mm3 8.51   HEMOGLOBIN g/dL 13.6   HEMATOCRIT % 38.4   PLATELETS 10*3/mm3 191     Results from last 7 days   Lab Units 12/08/19  1519   INR  1.08     Results from last 7 days   Lab Units 12/09/19  0504   CHOLESTEROL mg/dL 161     Results from last 7 days   Lab Units 12/08/19  1519   MAGNESIUM mg/dL 2.0     Results from last 7 days   Lab Units 12/09/19  0504   CHOLESTEROL mg/dL 161   TRIGLYCERIDES mg/dL 124   HDL CHOL mg/dL 35*   LDL CHOL mg/dL 101*     @LABRCNT(bnp)@  I reviewed the patient's new clinical results.  I personally viewed and interpreted the patient's EKG/Telemetry data            Assessment/Plan   1.  Inferior STEMI  2.  Coronary artery disease: Subtotally occluded RCA.  Discrete 60% mid circumflex coronary artery stenosis.  Diffuse 30% mid vessel LAD stenosis  3.  History of tobacco abuse  4.  Elevated alkaline phosphatase    -Mild sinus bradycardia.  Blood pressure is well controlled.  -Mild elevation of LDL on lab work.  I think 40 mg of atorvastatin will be acceptable  -CXR today. Not performed yesterday  -Repeat LFTs today.   -Ultrasound of the right upper quadrant today  -Transfer to the floor today.            Electronically signed by Erasto Linton MD at 12/09/19 0816       Consult Notes (last 24 hours) (Notes from 12/08/19 1458 through 12/09/19 1458)    No notes of this type exist for this encounter.

## 2019-12-09 NOTE — PROGRESS NOTES
"Patient Care Team:  Storm Murguia MD as PCP - General (Family Medicine)    Chief Complaint: Follow-up inferior ST elevation MI    Interval History:   Doing well.  No chest pain.  EKG improving.    Objective   Vital Signs  Temp:  [97.5 °F (36.4 °C)-98.5 °F (36.9 °C)] 97.5 °F (36.4 °C)  Heart Rate:  [] 56  Resp:  [16-20] 16  BP: (111-144)/() 122/81    Intake/Output Summary (Last 24 hours) at 12/9/2019 0811  Last data filed at 12/9/2019 0500  Gross per 24 hour   Intake 1000 ml   Output 400 ml   Net 600 ml     Flowsheet Rows      First Filed Value   Admission Height  182.9 cm (72.01\") Documented at 12/08/2019 1514   Admission Weight  95.3 kg (210 lb) Documented at 12/08/2019 1514          General Appearance:    Alert, cooperative, in no acute distress   Head:    Normocephalic, without obvious abnormality, atraumatic       Neck:   No adenopathy, supple, no thyromegaly, no carotid bruit, no    JVD   Lungs:     Clear to auscultation bilaterally, no wheezes, rales, or     rhonchi    Heart:    Normal rate, regular rhythm, no murmur, no rub, no gallop   Chest Wall:    No abnormalities observed   Abdomen:     Normal bowel sounds, soft, nontender, nondistended,            no rebound tenderness   Extremities:   No cyanosis, clubbing, or edema   Pulses:   Pulses palpable and equal bilaterally   Skin:   No bleeding or rash       Neurologic:   Cranial nerves 2 - 12 grossly intact, sensation intact             atorvastatin 40 mg Oral Nightly   metoprolol tartrate 25 mg Oral Q12H   [START ON 12/10/2019] prasugrel 10 mg Oral Daily   prasugrel 60 mg Oral Once            Results Review:    Results from last 7 days   Lab Units 12/09/19  0504   SODIUM mmol/L 142   POTASSIUM mmol/L 4.0   CHLORIDE mmol/L 107   CO2 mmol/L 23.0   BUN mg/dL 12   CREATININE mg/dL 1.02   GLUCOSE mg/dL 94   CALCIUM mg/dL 8.4*     Results from last 7 days   Lab Units 12/08/19  1806 12/08/19  1519   TROPONIN T ng/mL 0.627* <0.010     Results from " last 7 days   Lab Units 12/09/19  0504   WBC 10*3/mm3 8.51   HEMOGLOBIN g/dL 13.6   HEMATOCRIT % 38.4   PLATELETS 10*3/mm3 191     Results from last 7 days   Lab Units 12/08/19  1519   INR  1.08     Results from last 7 days   Lab Units 12/09/19  0504   CHOLESTEROL mg/dL 161     Results from last 7 days   Lab Units 12/08/19  1519   MAGNESIUM mg/dL 2.0     Results from last 7 days   Lab Units 12/09/19  0504   CHOLESTEROL mg/dL 161   TRIGLYCERIDES mg/dL 124   HDL CHOL mg/dL 35*   LDL CHOL mg/dL 101*     @LABRCNT(bnp)@  I reviewed the patient's new clinical results.  I personally viewed and interpreted the patient's EKG/Telemetry data            Assessment/Plan   1.  Inferior STEMI  2.  Coronary artery disease: Subtotally occluded RCA.  Discrete 60% mid circumflex coronary artery stenosis.  Diffuse 30% mid vessel LAD stenosis  3.  History of tobacco abuse  4.  Elevated alkaline phosphatase    -Mild sinus bradycardia.  Blood pressure is well controlled.  -Mild elevation of LDL on lab work.  I think 40 mg of atorvastatin will be acceptable  -CXR today. Not performed yesterday  -Repeat LFTs today.   -Ultrasound of the right upper quadrant today  -Transfer to the floor today.

## 2019-12-09 NOTE — CONSULTS
"Met with patient and his wife, discussed benefits of cardiac rehab. Provided phase II information packet, which includes; general information about cardiac rehab, hospitals Cardiac Rehab Programs handout and [facility] letter article entitled “Cardiac Rehab is often the Best Medicine for Recovery\", stresses the importance of cardiac rehab after a heart event.   I provided the contact information for cardiac rehab here at [facility]. However patient is from [place], [place], his wife states not sure where they will attend at this time.   Explained if receiving home health would not be able to attend cardiac rehab until finished with home health. Instructed to bring a copy of After Visit Summary to initial assessment.  Referral received for Phase II Cardiac Rehab.  Staff has reviewed chart and patient does not have a qualifying diagnosis for Phase II Cardiac Rehab at this time.  Staff available if further consultation is needed.        "

## 2019-12-09 NOTE — PLAN OF CARE
Problem: Patient Care Overview  Goal: Plan of Care Review  Outcome: Ongoing (interventions implemented as appropriate)   Pt did not c/o any CP throughout the night. Vitals stable.

## 2019-12-10 VITALS
TEMPERATURE: 97.9 F | BODY MASS INDEX: 28.23 KG/M2 | WEIGHT: 208.4 LBS | DIASTOLIC BLOOD PRESSURE: 72 MMHG | HEIGHT: 72 IN | SYSTOLIC BLOOD PRESSURE: 119 MMHG | RESPIRATION RATE: 18 BRPM | HEART RATE: 72 BPM | OXYGEN SATURATION: 95 %

## 2019-12-10 PROCEDURE — 99239 HOSP IP/OBS DSCHRG MGMT >30: CPT | Performed by: NURSE PRACTITIONER

## 2019-12-10 RX ORDER — ASPIRIN 81 MG/1
81 TABLET ORAL DAILY
Qty: 30 TABLET | Refills: 11 | Status: SHIPPED | OUTPATIENT
Start: 2019-12-11 | End: 2020-12-03 | Stop reason: SDUPTHER

## 2019-12-10 RX ORDER — METOPROLOL SUCCINATE 25 MG/1
25 TABLET, EXTENDED RELEASE ORAL DAILY
Qty: 30 TABLET | Refills: 11 | Status: SHIPPED | OUTPATIENT
Start: 2019-12-10 | End: 2020-12-03 | Stop reason: SDUPTHER

## 2019-12-10 RX ORDER — ATORVASTATIN CALCIUM 40 MG/1
40 TABLET, FILM COATED ORAL NIGHTLY
Qty: 30 TABLET | Refills: 11 | Status: SHIPPED | OUTPATIENT
Start: 2019-12-10 | End: 2020-12-03 | Stop reason: SDUPTHER

## 2019-12-10 RX ORDER — PRASUGREL 10 MG/1
10 TABLET, FILM COATED ORAL DAILY
Qty: 30 TABLET | Refills: 11 | Status: SHIPPED | OUTPATIENT
Start: 2019-12-11 | End: 2020-12-18

## 2019-12-10 RX ADMIN — ASPIRIN 81 MG: 81 TABLET, COATED ORAL at 08:31

## 2019-12-10 RX ADMIN — PRASUGREL 10 MG: 10 TABLET, FILM COATED ORAL at 08:32

## 2019-12-10 RX ADMIN — METOPROLOL TARTRATE 12.5 MG: 25 TABLET ORAL at 08:30

## 2019-12-10 NOTE — DISCHARGE SUMMARY
Hospital Discharge    Patient Name: Baron Abdullahi  Age/Sex: 49 y.o. male  : 1970  MRN: [id number]    Encounter Provider: MITRA Ambrosio  Referring Provider: Erasto Linton MD  Place of Service: [facility] CARDIOLOGY  Patient Care Team:  Storm Murguia MD as PCP - General (Family Medicine)         Date of Discharge:  12/10/2019   Date of Admit: 2019    Discharge Condition: Stable  Discharge Diagnosis:    ST elevation myocardial infarction involving right coronary artery (CMS/HCC)    Acute MI, inferior wall (CMS/HCC)      Hospital Course:   Baron Abdullahi is a 49 y.o. male who presented with acute onset of central chest discomfort.  He was noted to have an inferior STEMI and was life flighted to [facility] where he underwent cardiac catheterization.  This revealed a subtotally occluded distal RCA.  He underwent PCI to the distal RCA with a 3.0 x 33 mm Xience Lainey drug-eluting stent, postdilated to 3.5 mm in the proximal segment and 3.25 mm in the distal segment.  Secondary to distal cut off of the RPDA, he also received intracoronary Integrilin.  Recommendations were for dual antiplatelet therapy for 1 year.  Postprocedure echocardiogram revealed low normal systolic function with an estimated EF of 45%.  He underwent a gallbladder US due to elevated LFTs which was negative.  He will need to follow-up with GI outpatient.  This morning he is resting comfortably with no complaints of chest pain or shortness of breath.  He is stable and ready for discharge.  He will follow-up with me in 1 week and Dr. Linton in 1 month.  When he follows up with Dr. Linton, will plan on performing a stress test to evaluate borderline circumflex stenosis.    Objective:  Temp:  [97.9 °F (36.6 °C)-98.8 °F (37.1 °C)] 97.9 °F (36.6 °C)  Heart Rate:  [64-77] 72  Resp:  [16-18] 18  BP: (116-140)/(72-87) 119/72    Intake/Output Summary (Last 24 hours) at 12/10/2019  1006  Last data filed at 12/9/2019 2023  Gross per 24 hour   Intake 60 ml   Output --   Net 60 ml     Body mass index is 28.54 kg/m².      12/08/19  1651 12/09/19  1003 12/10/19  0400   Weight: 96.4 kg (212 lb 8.4 oz) 96 kg (211 lb 10.3 oz) 94.5 kg (208 lb 6.4 oz)     Weight change: 0.745 kg (1 lb 10.3 oz)    Physical Exam:  Physical Exam   Constitutional: He is oriented to person, place, and time. He appears well-developed and well-nourished. No distress.   HENT:   Head: Normocephalic and atraumatic.   Eyes: Pupils are equal, round, and reactive to light.   Neck: No JVD present. No thyromegaly present.   Cardiovascular: Normal rate, regular rhythm, normal heart sounds and intact distal pulses.   No murmur heard.  Pulmonary/Chest: Effort normal and breath sounds normal. No respiratory distress.   Abdominal: Soft. Bowel sounds are normal. He exhibits no distension. There is no splenomegaly or hepatomegaly. There is no tenderness.   Musculoskeletal: Normal range of motion. He exhibits no edema.   Neurological: He is alert and oriented to person, place, and time.   Skin: Skin is warm and dry. He is not diaphoretic. No erythema.   Radial site well healed without erythema or edema. Proximal and distal pulses palpable. Good capillary refill.    Psychiatric: He has a normal mood and affect. His behavior is normal. Judgment normal.       Procedures Performed  Procedure(s):  Left Heart Cath  Coronary angiography  Stent ARIEL coronary  Left ventriculography     Procedure findings:  Left main: Large caliber vessel that bifurcates to an LAD and circumflex.  Normal  LAD: Medium caliber vessel gives rise to a small caliber diagonal branch.  The proximal LAD has a discrete 40% stenosis.  The mid to distal LAD are normal  LCX: Medium caliber vessel gives rise to a medium caliber OM branch distally.  There is a discrete 60% mid vessel stenosis.  RCA: Large-caliber vessel that is dominant.  Gives rise to a small to medium caliber PDA  and small caliber RPL branch.  The RCA is subtotally occluded with a diffuse 99% distal stenosis.  HALEY I flow.  30% proximal PDA stenosis.     Left ventricular angiography: Normal left ventricular size and systolic function.  Left ventricular ejection fraction is 55%.  Mild inferior wall hypokinesis     Percutaneous intervention report: Unfractionated heparin was used for anticoagulation and confirm therapeutic ACT.  A 6 Swedish JR4 guide catheter was used to engage the right coronary.  A run-through wire was unable to cross the occlusion distally.  Following this a whisper extra-support was able to cross that occlusion.  Was predilated with a 3.0 x 20 mm trek balloon.  Following this a 3.0 x 33 mm Xience Lainey drug-eluting stent was then deployed across the distal RCA into the PDA.  The proximal segment of the stent was postdilated with a 3.5 x 20 mm noncompliant trek balloon to 20 ted.  The distal segment of the stent was postdilated with a 3.25 x 12 mm noncompliant trek balloon to 14 ted.  There was distal cut off of the RPDA branch but good flow to the very tip.  Intracoronary adenosine and intracoronary Integrilin were then given.  The patient tolerated the procedure well.  Wire and balloon were then removed.  No complications.     Hemodynamics:   LV: 102/25  AO: 102/72        PCI CORONARY SEGMENT: Distal RCA  PRE-STENOSIS:99  POST-STENOSIS: 0%  LESION TYPE: c  HALEY FLOW PRE/POST: 1/3  CULPRIT LESION: Yes     Estimated blood loss: Minimal  Complications: None     Conclusions:   1. Left main: Normal  2. LAD: 40% proximal stenosis.  3. LCX: Discrete 60% mid vessel stenosis.  4. RCA: Subtotally occluded in the distal segment with a diffuse 99% distal RCA stenosis.  30% PDA stenosis.  5.  Normal left ventricular size and systolic function  6.  Successful PCI of the distal RCA extending into the PDA with a 3.0 x 33 mm Xience Lainey drug-eluting stent, postdilated with a 3.55 noncompliant balloon in the proximal  stent segment and a 3.25 noncompliant trek balloon in the mid to distal stent segment.        Recommendations: Dual antiplatelet therapy for least 1 year.  Plan on considering stress test as an outpatient to evaluate borderline circumflex stenosis    Echocardiogram 12/9/2019  · Estimated EF = 45%. Quantified left ventricular ejection fraction 49%.  · Left ventricular systolic function is low normal.  · The following left ventricular wall segments are mildly hypokinetic: basal inferolateral, mid inferolateral and basal inferior.  ·   Consults:  Consults     Date and Time Order Name Status Description    12/9/2019 1409 Inpatient Gastroenterology Consult      12/8/2019 1515 Consult Interventional Cardiology and Notify Cath Lab Completed           Pertinent Test Results:  Results from last 7 days   Lab Units 12/09/19  0504 12/08/19  1519   SODIUM mmol/L 142 143   POTASSIUM mmol/L 4.0 4.5   CHLORIDE mmol/L 107 107   CO2 mmol/L 23.0 24.7   BUN mg/dL 12 15   CREATININE mg/dL 1.02 1.21   GLUCOSE mg/dL 94 131*   CALCIUM mg/dL 8.4* 8.8   AST (SGOT) U/L 75* 21   ALT (SGPT) U/L 27 26     Results from last 7 days   Lab Units 12/08/19  1806 12/08/19  1519   TROPONIN T ng/mL 0.627* <0.010     Results from last 7 days   Lab Units 12/09/19  0504 12/08/19  1519   WBC 10*3/mm3 8.51 6.33   HEMOGLOBIN g/dL 13.6 14.8   HEMATOCRIT % 38.4 41.2   PLATELETS 10*3/mm3 191 213     Results from last 7 days   Lab Units 12/08/19  1519   INR  1.08     Results from last 7 days   Lab Units 12/08/19  1519   MAGNESIUM mg/dL 2.0     Results from last 7 days   Lab Units 12/09/19  0504   CHOLESTEROL mg/dL 161   TRIGLYCERIDES mg/dL 124   HDL CHOL mg/dL 35*               Discharge Medications     Discharge Medications      New Medications      Instructions Start Date   aspirin 81 MG EC tablet  Replaces:  aspirin 81 MG chewable tablet   81 mg, Oral, Daily   Start Date:  December 11, 2019     atorvastatin 40 MG tablet  Commonly known as:  LIPITOR   40 mg,  Oral, Nightly      metoprolol succinate XL 25 MG 24 hr tablet  Commonly known as:  TOPROL-XL   25 mg, Oral, Daily      prasugrel 10 MG tablet  Commonly known as:  EFFIENT   10 mg, Oral, Daily   Start Date:  December 11, 2019        Continue These Medications      Instructions Start Date   probenecid 500 MG tablet  Commonly known as:  BENEMID   500 mg, Oral, 2 Times Daily         Stop These Medications    aspirin 81 MG chewable tablet  Replaced by:  aspirin 81 MG EC tablet            Discharge Diet:    Dietary Orders (From admission, onward)     Start     Ordered    12/08/19 1655  Diet Regular; Consistent Carbohydrate, Cardiac  Diet Effective Now     Question Answer Comment   Diet Texture / Consistency Regular    Common Modifiers Consistent Carbohydrate    Common Modifiers Cardiac        12/08/19 1654                Activity at Discharge:  As tolerated.     Discharge disposition: home     Discharge Instructions and Follow ups:  No future appointments.  Additional Instructions for the Follow-ups that You Need to Schedule     Ambulatory Referral to Cardiac Rehab   As directed        Follow-up Information     [facility] CARD REHAB .    Specialty:  Cardiac Rehabilitation  Contact information:  [address] [facility] [address] [phone]           Storm Murguia MD .    Specialty:  Family Medicine  Contact information:  [address]  [place] [address] [phone]                   Test Results Pending at Discharge:      MITRA Ambrosio  12/10/19  10:06 AM    Time: Discharge >30 min

## 2019-12-11 ENCOUNTER — READMISSION MANAGEMENT (OUTPATIENT)
Dept: CALL CENTER | Facility: HOSPITAL | Age: 49
End: 2019-12-11

## 2019-12-11 NOTE — PAYOR COMM NOTE
"Baron Bradly (49 y.o. Male)                  ATTENTION;   DC SUMMARY CASE REF # [id number]        Date of Birth Social Security Number Address Home Phone MRN    1970  [address] [place] [address] [phone] [id number]    Denominational Marital Status          Gnosticist        Admission Date Admission Type Admitting Provider Attending Provider Department, Room/Bed    19 Emergency Erasto Linton MD  [address] [facility],     Discharge Date Discharge Disposition Discharge Destination        12/10/2019 Home or Self Care              Attending Provider:  (none)   Allergies:  Morphine    Isolation:  None   Infection:  None   Code Status:  Prior    Ht:  182 cm (71.65\")   Wt:  94.5 kg (208 lb 6.4 oz)    Admission Cmt:  None   Principal Problem:  None                Active Insurance as of 2019     Primary Coverage     Payor Plan Insurance Group Employer/Plan Group    [id number]     Payor Plan Address Payor Plan Phone Number Payor Plan Fax Number Effective Dates    [address]   2019 - None Entered    [place] [address]       Subscriber Name Subscriber Birth Date Member ID       BARON BRADLY 1970 [id number]                 Emergency Contacts      (Rel.) Home Phone Work Phone Mobile Phone    Vonda Abdullahi (Spouse) [phone] -- --               Discharge Summary      Elisa Carlos APRN at 12/10/19 0915              Hospital Discharge    Patient Name: Baron Abdullahi  Age/Sex: 49 y.o. male  : 1970  MRN: [id number]    Encounter Provider: MITRA Ambrosio  Referring Provider: Erasto Linton MD  Place of Service: [facility] CARDIOLOGY  Patient Care Team:  Storm Murguia MD as PCP - General (Family Medicine)         Date of Discharge:  12/10/2019   Date of Admit: 2019    Discharge Condition: Stable  Discharge Diagnosis:    ST " elevation myocardial infarction involving right coronary artery (CMS/HCC)    Acute MI, inferior wall (CMS/HCC)      Hospital Course:   Baron Abdullahi is a 49 y.o. male who presented with acute onset of central chest discomfort.  He was noted to have an inferior STEMI and was life flighted to [facility] where he underwent cardiac catheterization.  This revealed a subtotally occluded distal RCA.  He underwent PCI to the distal RCA with a 3.0 x 33 mm Xience Lainey drug-eluting stent, postdilated to 3.5 mm in the proximal segment and 3.25 mm in the distal segment.  Secondary to distal cut off of the RPDA, he also received intracoronary Integrilin.  Recommendations were for dual antiplatelet therapy for 1 year.  Postprocedure echocardiogram revealed low normal systolic function with an estimated EF of 45%.  He underwent a gallbladder US due to elevated LFTs which was negative.  He will need to follow-up with GI outpatient.  This morning he is resting comfortably with no complaints of chest pain or shortness of breath.  He is stable and ready for discharge.  He will follow-up with me in 1 week and Dr. Linton in 1 month.  When he follows up with Dr. Linton, will plan on performing a stress test to evaluate borderline circumflex stenosis.    Objective:  Temp:  [97.9 °F (36.6 °C)-98.8 °F (37.1 °C)] 97.9 °F (36.6 °C)  Heart Rate:  [64-77] 72  Resp:  [16-18] 18  BP: (116-140)/(72-87) 119/72    Intake/Output Summary (Last 24 hours) at 12/10/2019 1006  Last data filed at 12/9/2019 2023  Gross per 24 hour   Intake 60 ml   Output --   Net 60 ml     Body mass index is 28.54 kg/m².      12/08/19  1651 12/09/19  1003 12/10/19  0400   Weight: 96.4 kg (212 lb 8.4 oz) 96 kg (211 lb 10.3 oz) 94.5 kg (208 lb 6.4 oz)     Weight change: 0.745 kg (1 lb 10.3 oz)    Physical Exam:  Physical Exam   Constitutional: He is oriented to person, place, and time. He appears well-developed and well-nourished. No distress.   HENT:   Head:  Normocephalic and atraumatic.   Eyes: Pupils are equal, round, and reactive to light.   Neck: No JVD present. No thyromegaly present.   Cardiovascular: Normal rate, regular rhythm, normal heart sounds and intact distal pulses.   No murmur heard.  Pulmonary/Chest: Effort normal and breath sounds normal. No respiratory distress.   Abdominal: Soft. Bowel sounds are normal. He exhibits no distension. There is no splenomegaly or hepatomegaly. There is no tenderness.   Musculoskeletal: Normal range of motion. He exhibits no edema.   Neurological: He is alert and oriented to person, place, and time.   Skin: Skin is warm and dry. He is not diaphoretic. No erythema.   Radial site well healed without erythema or edema. Proximal and distal pulses palpable. Good capillary refill.    Psychiatric: He has a normal mood and affect. His behavior is normal. Judgment normal.       Procedures Performed  Procedure(s):  Left Heart Cath  Coronary angiography  Stent ARIEL coronary  Left ventriculography     Procedure findings:  Left main: Large caliber vessel that bifurcates to an LAD and circumflex.  Normal  LAD: Medium caliber vessel gives rise to a small caliber diagonal branch.  The proximal LAD has a discrete 40% stenosis.  The mid to distal LAD are normal  LCX: Medium caliber vessel gives rise to a medium caliber OM branch distally.  There is a discrete 60% mid vessel stenosis.  RCA: Large-caliber vessel that is dominant.  Gives rise to a small to medium caliber PDA and small caliber RPL branch.  The RCA is subtotally occluded with a diffuse 99% distal stenosis.  HALEY I flow.  30% proximal PDA stenosis.     Left ventricular angiography: Normal left ventricular size and systolic function.  Left ventricular ejection fraction is 55%.  Mild inferior wall hypokinesis     Percutaneous intervention report: Unfractionated heparin was used for anticoagulation and confirm therapeutic ACT.  A 6 Lao JR4 guide catheter was used to engage the  right coronary.  A run-through wire was unable to cross the occlusion distally.  Following this a whisper extra-support was able to cross that occlusion.  Was predilated with a 3.0 x 20 mm trek balloon.  Following this a 3.0 x 33 mm Xience Lainey drug-eluting stent was then deployed across the distal RCA into the PDA.  The proximal segment of the stent was postdilated with a 3.5 x 20 mm noncompliant trek balloon to 20 ted.  The distal segment of the stent was postdilated with a 3.25 x 12 mm noncompliant trek balloon to 14 ted.  There was distal cut off of the RPDA branch but good flow to the very tip.  Intracoronary adenosine and intracoronary Integrilin were then given.  The patient tolerated the procedure well.  Wire and balloon were then removed.  No complications.     Hemodynamics:   LV: 102/25  AO: 102/72        PCI CORONARY SEGMENT: Distal RCA  PRE-STENOSIS:99  POST-STENOSIS: 0%  LESION TYPE: c  HALEY FLOW PRE/POST: 1/3  CULPRIT LESION: Yes     Estimated blood loss: Minimal  Complications: None     Conclusions:   1. Left main: Normal  2. LAD: 40% proximal stenosis.  3. LCX: Discrete 60% mid vessel stenosis.  4. RCA: Subtotally occluded in the distal segment with a diffuse 99% distal RCA stenosis.  30% PDA stenosis.  5.  Normal left ventricular size and systolic function  6.  Successful PCI of the distal RCA extending into the PDA with a 3.0 x 33 mm Xience Lainey drug-eluting stent, postdilated with a 3.55 noncompliant balloon in the proximal stent segment and a 3.25 noncompliant trek balloon in the mid to distal stent segment.        Recommendations: Dual antiplatelet therapy for least 1 year.  Plan on considering stress test as an outpatient to evaluate borderline circumflex stenosis    Echocardiogram 12/9/2019  · Estimated EF = 45%. Quantified left ventricular ejection fraction 49%.  · Left ventricular systolic function is low normal.  · The following left ventricular wall segments are mildly hypokinetic:  basal inferolateral, mid inferolateral and basal inferior.  ·   Consults:  Consults     Date and Time Order Name Status Description    12/9/2019 1409 Inpatient Gastroenterology Consult      12/8/2019 1515 Consult Interventional Cardiology and Notify Cath Lab Completed           Pertinent Test Results:  Results from last 7 days   Lab Units 12/09/19  0504 12/08/19  1519   SODIUM mmol/L 142 143   POTASSIUM mmol/L 4.0 4.5   CHLORIDE mmol/L 107 107   CO2 mmol/L 23.0 24.7   BUN mg/dL 12 15   CREATININE mg/dL 1.02 1.21   GLUCOSE mg/dL 94 131*   CALCIUM mg/dL 8.4* 8.8   AST (SGOT) U/L 75* 21   ALT (SGPT) U/L 27 26     Results from last 7 days   Lab Units 12/08/19  1806 12/08/19  1519   TROPONIN T ng/mL 0.627* <0.010     Results from last 7 days   Lab Units 12/09/19  0504 12/08/19  1519   WBC 10*3/mm3 8.51 6.33   HEMOGLOBIN g/dL 13.6 14.8   HEMATOCRIT % 38.4 41.2   PLATELETS 10*3/mm3 191 213     Results from last 7 days   Lab Units 12/08/19  1519   INR  1.08     Results from last 7 days   Lab Units 12/08/19  1519   MAGNESIUM mg/dL 2.0     Results from last 7 days   Lab Units 12/09/19  0504   CHOLESTEROL mg/dL 161   TRIGLYCERIDES mg/dL 124   HDL CHOL mg/dL 35*               Discharge Medications     Discharge Medications      New Medications      Instructions Start Date   aspirin 81 MG EC tablet  Replaces:  aspirin 81 MG chewable tablet   81 mg, Oral, Daily   Start Date:  December 11, 2019     atorvastatin 40 MG tablet  Commonly known as:  LIPITOR   40 mg, Oral, Nightly      metoprolol succinate XL 25 MG 24 hr tablet  Commonly known as:  TOPROL-XL   25 mg, Oral, Daily      prasugrel 10 MG tablet  Commonly known as:  EFFIENT   10 mg, Oral, Daily   Start Date:  December 11, 2019        Continue These Medications      Instructions Start Date   probenecid 500 MG tablet  Commonly known as:  BENEMID   500 mg, Oral, 2 Times Daily         Stop These Medications    aspirin 81 MG chewable tablet  Replaced by:  aspirin 81 MG EC tablet             Discharge Diet:    Dietary Orders (From admission, onward)     Start     Ordered    12/08/19 1655  Diet Regular; Consistent Carbohydrate, Cardiac  Diet Effective Now     Question Answer Comment   Diet Texture / Consistency Regular    Common Modifiers Consistent Carbohydrate    Common Modifiers Cardiac        12/08/19 1654                Activity at Discharge:  As tolerated.     Discharge disposition: home     Discharge Instructions and Follow ups:  No future appointments.  Additional Instructions for the Follow-ups that You Need to Schedule     Ambulatory Referral to Cardiac Rehab   As directed        Follow-up Information     [facility] CARD REHAB .    Specialty:  Cardiac Rehabilitation  Contact information:  [address] [facility] [address] [phone]           Storm Murguia MD .    Specialty:  Family Medicine  Contact information:  [address] [place] [address] [phone]                   Test Results Pending at Discharge:      MITRA Ambrosio  12/10/19  10:06 AM    Time: Discharge >30 min        Electronically signed by Erasto Linton MD at 12/10/19 2742

## 2019-12-11 NOTE — OUTREACH NOTE
Prep Survey      Responses   Facility patient discharged from?  [facility]   Is patient eligible?  Yes   Discharge diagnosis  STEMI, s/p cardiac cath PCI to distal RCA   Does the patient have one of the following disease processes/diagnoses(primary or secondary)?  Acute MI (STEMI,NSTEMI)   Does the patient have Home health ordered?  No   Is there a DME ordered?  No   Comments regarding appointments  Pt to schedule follow up with PCP   Prep survey completed?  Yes          Court Daly RN

## 2019-12-13 ENCOUNTER — READMISSION MANAGEMENT (OUTPATIENT)
Dept: CALL CENTER | Facility: HOSPITAL | Age: 49
End: 2019-12-13

## 2019-12-13 NOTE — OUTREACH NOTE
AMI Week 1 Survey      Responses   Facility patient discharged from?  [facility]   Does the patient have one of the following disease processes/diagnoses(primary or secondary)?  Acute MI (STEMI,NSTEMI)   Is there a successful TCM telephone encounter documented?  No   Week 1 attempt successful?  Yes   Call start time  1208   Call end time  1219   Meds reviewed with patient/caregiver?  Yes   Is the patient having any side effects they believe may be caused by any medication additions or changes?  No   Does the patient have all prescriptions related to this admission filled (includes statins,anticoagulants,HTN meds,anti-arrhythmia meds)  Yes   Is the patient taking all medications as directed (includes completed medication regime)?  Yes   Does the patient have a primary care provider?   Yes   Does the patient have an appointment with their PCP,cardiologist,or clinic within 7 days of discharge?  Greater than 7 days   Comments regarding PCP  PCP appt 12/26/19   What is preventing the patient from scheduling follow up appointments within 7 days of discharge?  Earlier appointment not available   Nursing Interventions  Verified appointment date/time/provider   Has the patient kept scheduled appointments due by today?  N/A   Comments  cardiologist 12/20/19   Has home health visited the patient within 72 hours of discharge?  N/A   Psychosocial issues?  No   Did the patient receive a copy of their discharge instructions?  Yes   Nursing interventions  Reviewed instructions with patient, Educated on MyChart   What is the patient's perception of their health status since discharge?  Improving   Nursing interventions  Nurse provided patient education   Is the patient/caregiver able to teach back signs and symptoms of when to call for help immediately:  Sudden chest discomfort, Sudden discomfort in arms, back, neck or jaw, Shortness of breath at any time, Sudden sweating or clammy skin, Nausea or vomiting, Dizziness or  lightheadedness, Irregular or rapid heart rate   Nursing interventions  Nurse provided patient education   Is the pateint /caregiver able to teach back the importance of cardiac rehab?  Yes   Nursing interventions  Provided education on importance of cardiac rehab   Is the patient/caregiver able to teach back lifestyle changes to help prevent MIs  Heart healthy diet, Limiting alcohol intake, Regular exercise as approved by provider, Maintaining a healthy weight, Reducing stress   Is the patient/caregiver able to teach back ways to prevent a second heart attack:  Take medications, Follow up with MD, Participate in Cardiac Rehab, Manage risk factors, Get support (AHA website:supportnetwork.heart.org)   If the patient is a current smoker, are they able to teach back resources for cessation?  -- [nonsmoker at this point ]   Is the patient/caregiver able to teach back the hierarchy of who to call/visit for symptoms/problems? PCP, Specialist, Home health nurse, Urgent Care, ED, 911  Yes   Additional teach back comments  Reviewed cardiac s/s.   Week 1 call completed?  Yes   Wrap up additional comments  States is feeling better-denies any cardiac s/s. States wrist site is clean, dry, intact without s/s of infection. Denies any complaints today.          Catalina Meeks RN

## 2019-12-18 ENCOUNTER — OFFICE VISIT (OUTPATIENT)
Dept: GASTROENTEROLOGY | Facility: CLINIC | Age: 49
End: 2019-12-18

## 2019-12-18 VITALS
HEIGHT: 72 IN | WEIGHT: 209.8 LBS | TEMPERATURE: 97.9 F | DIASTOLIC BLOOD PRESSURE: 72 MMHG | BODY MASS INDEX: 28.42 KG/M2 | SYSTOLIC BLOOD PRESSURE: 116 MMHG

## 2019-12-18 DIAGNOSIS — R74.8 ELEVATED LIVER ENZYMES: Primary | ICD-10-CM

## 2019-12-18 DIAGNOSIS — R74.8 ELEVATED ALKALINE PHOSPHATASE LEVEL: ICD-10-CM

## 2019-12-18 PROBLEM — I25.5 ISCHEMIC CARDIOMYOPATHY: Status: ACTIVE | Noted: 2019-12-18

## 2019-12-18 PROBLEM — I25.10 CORONARY ARTERY DISEASE INVOLVING NATIVE CORONARY ARTERY OF NATIVE HEART WITHOUT ANGINA PECTORIS: Status: ACTIVE | Noted: 2019-12-18

## 2019-12-18 PROCEDURE — 99204 OFFICE O/P NEW MOD 45 MIN: CPT | Performed by: INTERNAL MEDICINE

## 2019-12-18 NOTE — PATIENT INSTRUCTIONS
Labs today    No alleve, no alcohol for the time being    Tylenol is ok    For any additional questions, concerns or changes to your condition after today's office visit please contact the office at [phone].

## 2019-12-18 NOTE — PROGRESS NOTES
Chief Complaint   Patient presents with   • Elevated Hepatic Enzymes       Subjective     HPI    Baron Abdullahi is a 49 y.o. male with a past medical history noted below who presents for evaluation of elevated liver enzymes.  This has been a chronic issue going back 10 years and he attributed to allopurinol.  His pcp had followed him for this.  He does not recall the level of elevation of enzymes previously.  On 12/8, he had a heart attack.  He was brought to [facility] and had a stent placed.  His alk phos was found to be elevated at 265-- moderately severe at 2x upper limits,  and then his AST bumped up to 75, his AST and ALT had been in normal on December 8.  His alkaline phosphatase was consistently 2 times upper limit of normal on both 12/8 and 12/9.  He denies any symptoms from the elevated transaminases including no right upper quadrant pain, pruritus, jaundice, icterus, nausea, vomiting.  Liver ultrasound prior to his discharge from the hospital was normal.    He was drinking daily up to his heart attack-- drinking 1-5 drinks per day, beer and bourbon.  He was taking alleve up to 4 times per day following a knee surgery as well.    No family history of liver disease.  No family history of GI malignancy.    He denies any over-the-counter supplements    No history of hepatitis nor any risk factors for exposure.    No abdominal surgeries.      He has since had some fatigue but is recovering from his heart attack and stents.  He sees a cardiologist tomorrow.      Past Medical History:   Diagnosis Date   • Gout          Current Outpatient Medications:   •  aspirin 81 MG EC tablet, Take 1 tablet by mouth Daily., Disp: 30 tablet, Rfl: 11  •  atorvastatin (LIPITOR) 40 MG tablet, Take 1 tablet by mouth Every Night., Disp: 30 tablet, Rfl: 11  •  metoprolol succinate XL (TOPROL-XL) 25 MG 24 hr tablet, Take 1 tablet by mouth Daily., Disp: 30 tablet, Rfl: 11  •  prasugrel (EFFIENT) 10 MG tablet, Take 1 tablet by mouth Daily.,  Disp: 30 tablet, Rfl: 11  •  probenecid (BENEMID) 500 MG tablet, Take 500 mg by mouth 2 (Two) Times a Day., Disp: , Rfl:     Allergies   Allergen Reactions   • Morphine Hives       Social History     Socioeconomic History   • Marital status:      Spouse name: Not on file   • Number of children: Not on file   • Years of education: Not on file   • Highest education level: Not on file   Tobacco Use   • Smoking status: Former Smoker     Packs/day: 0.50     Years: 10.00     Pack years: 5.00     Types: Cigarettes     Start date: 1988     Last attempt to quit: 4/15/2010     Years since quittin.6   • Smokeless tobacco: Never Used   • Tobacco comment: on and off smoker and chew until quit date   Substance and Sexual Activity   • Alcohol use: Yes     Alcohol/week: 35.0 standard drinks     Types: 15 Cans of beer, 20 Standard drinks or equivalent per week   • Drug use: Never   • Sexual activity: Yes     Partners: Female     Birth control/protection: None       History reviewed. No pertinent family history.    Review of Systems   Constitutional: Positive for fatigue. Negative for activity change and appetite change.   HENT: Negative for sore throat and trouble swallowing.    Respiratory: Negative.    Cardiovascular: Negative.    Gastrointestinal: Negative for abdominal distention, abdominal pain and blood in stool.   Endocrine: Negative for cold intolerance and heat intolerance.   Genitourinary: Negative for difficulty urinating, dysuria and frequency.   Musculoskeletal: Positive for arthralgias. Negative for back pain and myalgias.   Skin: Negative.    Hematological: Negative for adenopathy. Does not bruise/bleed easily.   All other systems reviewed and are negative.      Objective     Vitals:    19 1519   BP: 116/72   Temp: 97.9 °F (36.6 °C)         19  1519   Weight: 95.2 kg (209 lb 12.8 oz)     Body mass index is 28.45 kg/m².    Physical Exam   Constitutional: He is oriented to person, place, and  time. He appears well-developed and well-nourished. No distress.   HENT:   Head: Normocephalic and atraumatic.   Right Ear: External ear normal.   Left Ear: External ear normal.   Nose: Nose normal.   Mouth/Throat: Oropharynx is clear and moist.   Eyes: Conjunctivae and EOM are normal. Right eye exhibits no discharge. Left eye exhibits no discharge. No scleral icterus.   Neck: Normal range of motion. Neck supple. No thyromegaly present.   No supraclavicular adenopathy   Cardiovascular: Normal rate, regular rhythm, normal heart sounds and intact distal pulses. Exam reveals no gallop.   No murmur heard.  No lower extremity edema   Pulmonary/Chest: Effort normal and breath sounds normal. No respiratory distress. He has no wheezes.   Abdominal: Soft. Normal appearance and bowel sounds are normal. He exhibits no distension and no mass. There is no hepatosplenomegaly. There is no tenderness. There is no rigidity, no rebound and no guarding. No hernia.   Genitourinary:   Genitourinary Comments: Rectal exam deferred   Musculoskeletal: Normal range of motion. He exhibits no edema or tenderness.   No atrophy of upper or lower extremities.  Normal digits and nails of both hands.   Lymphadenopathy:     He has no cervical adenopathy.   Neurological: He is alert and oriented to person, place, and time. He displays no atrophy. Coordination normal.   Skin: Skin is warm and dry. No rash noted. He is not diaphoretic. No erythema.   Psychiatric: He has a normal mood and affect. His behavior is normal. Judgment and thought content normal.   Vitals reviewed.      WBC   Date Value Ref Range Status   12/09/2019 8.51 3.40 - 10.80 10*3/mm3 Final     RBC   Date Value Ref Range Status   12/09/2019 4.25 4.14 - 5.80 10*6/mm3 Final     Hemoglobin   Date Value Ref Range Status   12/09/2019 13.6 13.0 - 17.7 g/dL Final     Hematocrit   Date Value Ref Range Status   12/09/2019 38.4 37.5 - 51.0 % Final     MCV   Date Value Ref Range Status    12/09/2019 90.4 79.0 - 97.0 fL Final     MCH   Date Value Ref Range Status   12/09/2019 32.0 26.6 - 33.0 pg Final     MCHC   Date Value Ref Range Status   12/09/2019 35.4 31.5 - 35.7 g/dL Final     RDW   Date Value Ref Range Status   12/09/2019 11.9 (L) 12.3 - 15.4 % Final     RDW-SD   Date Value Ref Range Status   12/09/2019 38.8 37.0 - 54.0 fl Final     MPV   Date Value Ref Range Status   12/09/2019 10.4 6.0 - 12.0 fL Final     Platelets   Date Value Ref Range Status   12/09/2019 191 140 - 450 10*3/mm3 Final     Neutrophil %   Date Value Ref Range Status   12/08/2019 54.9 42.7 - 76.0 % Final     Lymphocyte %   Date Value Ref Range Status   12/08/2019 29.5 19.6 - 45.3 % Final     Monocyte %   Date Value Ref Range Status   12/08/2019 9.3 5.0 - 12.0 % Final     Eosinophil %   Date Value Ref Range Status   12/08/2019 4.4 0.3 - 6.2 % Final     Basophil %   Date Value Ref Range Status   12/08/2019 1.1 0.0 - 1.5 % Final     Immature Grans %   Date Value Ref Range Status   12/08/2019 0.8 (H) 0.0 - 0.5 % Final     Neutrophils, Absolute   Date Value Ref Range Status   12/08/2019 3.47 1.70 - 7.00 10*3/mm3 Final     Lymphocytes, Absolute   Date Value Ref Range Status   12/08/2019 1.87 0.70 - 3.10 10*3/mm3 Final     Monocytes, Absolute   Date Value Ref Range Status   12/08/2019 0.59 0.10 - 0.90 10*3/mm3 Final     Eosinophils, Absolute   Date Value Ref Range Status   12/08/2019 0.28 0.00 - 0.40 10*3/mm3 Final     Basophils, Absolute   Date Value Ref Range Status   12/08/2019 0.07 0.00 - 0.20 10*3/mm3 Final     Immature Grans, Absolute   Date Value Ref Range Status   12/08/2019 0.05 0.00 - 0.05 10*3/mm3 Final     nRBC   Date Value Ref Range Status   12/08/2019 0.0 0.0 - 0.2 /100 WBC Final       Glucose   Date Value Ref Range Status   12/09/2019 94 65 - 99 mg/dL Final     Sodium   Date Value Ref Range Status   12/09/2019 142 136 - 145 mmol/L Final     Potassium   Date Value Ref Range Status   12/09/2019 4.0 3.5 - 5.2 mmol/L  Final     CO2   Date Value Ref Range Status   12/09/2019 23.0 22.0 - 29.0 mmol/L Final     Chloride   Date Value Ref Range Status   12/09/2019 107 98 - 107 mmol/L Final     Anion Gap   Date Value Ref Range Status   12/09/2019 12.0 5.0 - 15.0 mmol/L Final     Creatinine   Date Value Ref Range Status   12/09/2019 1.02 0.76 - 1.27 mg/dL Final     BUN   Date Value Ref Range Status   12/09/2019 12 6 - 20 mg/dL Final     BUN/Creatinine Ratio   Date Value Ref Range Status   12/09/2019 11.8 7.0 - 25.0 Final     Calcium   Date Value Ref Range Status   12/09/2019 8.4 (L) 8.6 - 10.5 mg/dL Final     eGFR Non  Amer   Date Value Ref Range Status   12/09/2019 78 >60 mL/min/1.73 Final     Alkaline Phosphatase   Date Value Ref Range Status   12/09/2019 230 (H) 39 - 117 U/L Final     Total Protein   Date Value Ref Range Status   12/09/2019 6.2 6.0 - 8.5 g/dL Final     ALT (SGPT)   Date Value Ref Range Status   12/09/2019 27 1 - 41 U/L Final     AST (SGOT)   Date Value Ref Range Status   12/09/2019 75 (H) 1 - 40 U/L Final     Total Bilirubin   Date Value Ref Range Status   12/09/2019 0.4 0.2 - 1.2 mg/dL Final     Albumin   Date Value Ref Range Status   12/09/2019 3.50 3.50 - 5.20 g/dL Final     Globulin   Date Value Ref Range Status   12/08/2019 2.5 gm/dL Final         RIGHT UPPER QUADRANT SONOGRAM     HISTORY: Abnormal liver function test.     TECHNIQUE: Sonogram of the right upper quadrant was performed in  standard fashion. There is no previous imaging for correlation.     FINDINGS: The visualized parenchyma of the liver, pancreas, and the  right kidney appears normal. The gallbladder is in situ and has a normal  appearance. There is no sonographic Cortes sign. The common bile duct  measures 4 mm. The right kidney measures 11.3 cm long.     IMPRESSION:  Negative right upper quadrant sonogram.     This report was finalized on 12/9/2019 5:15 PM by Dr. Storm Barrera M.D.    No notes on file    Assessment/Plan      Elevated  alkaline phosphatase: Moderately severe, 2 times upper limit of normal.  Persistent on both lab checks 12/8 and 12/9.  He does endorse a chronic elevation in his transaminases and I do wonder if it is alkaline phosphatase    Elevated liver enzymes: AST.  This was following his MI and I suspect this was muscle origin    Plan  Full serologic work-up today including a repeat CMP, CBC and full liver serologies including hepatitis panel  Further recommendations following lab results  Advised no Aleve and no alcohol until we get a handle on what is going on with his liver; he has not had any alcohol since his heart attack and is in complete agreement with this plan     was seen today for elevated hepatic enzymes.    Diagnoses and all orders for this visit:    Elevated liver enzymes  -     Ferritin  -     Iron Profile  -     Alpha - 1 - Antitrypsin Phenotype  -     JESSICA  -     Anti-Smooth Muscle Antibody Titer  -     Ceruloplasmin  -     Gamma GT  -     Hepatitis Panel, Acute  -     Mitochondrial Antibodies, M2  -     CK  -     Comprehensive Metabolic Panel  -     CBC & Differential    Elevated alkaline phosphatase level        I have discussed the above plan with the patient.  They verbalize understanding and are in agreement with the plan.  They have been advised to contact the office for any questions, concerns, or changes related to their health.    Dictated utilizing Dragon dictation

## 2019-12-19 ENCOUNTER — OFFICE VISIT (OUTPATIENT)
Dept: CARDIOLOGY | Facility: CLINIC | Age: 49
End: 2019-12-19

## 2019-12-19 VITALS
HEIGHT: 72 IN | HEART RATE: 72 BPM | BODY MASS INDEX: 28.06 KG/M2 | SYSTOLIC BLOOD PRESSURE: 112 MMHG | WEIGHT: 207.2 LBS | OXYGEN SATURATION: 96 % | DIASTOLIC BLOOD PRESSURE: 76 MMHG | RESPIRATION RATE: 18 BRPM

## 2019-12-19 DIAGNOSIS — R74.8 ELEVATED LIVER ENZYMES: ICD-10-CM

## 2019-12-19 DIAGNOSIS — I25.10 CORONARY ARTERY DISEASE INVOLVING NATIVE CORONARY ARTERY OF NATIVE HEART WITHOUT ANGINA PECTORIS: Primary | ICD-10-CM

## 2019-12-19 DIAGNOSIS — I25.5 ISCHEMIC CARDIOMYOPATHY: ICD-10-CM

## 2019-12-19 DIAGNOSIS — I21.19 ACUTE MI, INFERIOR WALL (HCC): ICD-10-CM

## 2019-12-19 LAB
ACTIN IGG SERPL-ACNC: 14 UNITS (ref 0–19)
ALBUMIN SERPL-MCNC: 4.7 G/DL (ref 3.5–5.5)
ALBUMIN/GLOB SERPL: 1.8 {RATIO} (ref 1.2–2.2)
ALP SERPL-CCNC: 279 IU/L (ref 39–117)
ALT SERPL-CCNC: 33 IU/L (ref 0–44)
ANA SER QL: NEGATIVE
AST SERPL-CCNC: 29 IU/L (ref 0–40)
BILIRUB SERPL-MCNC: 0.4 MG/DL (ref 0–1.2)
BUN SERPL-MCNC: 14 MG/DL (ref 6–24)
BUN/CREAT SERPL: 11 (ref 9–20)
CALCIUM SERPL-MCNC: 9.5 MG/DL (ref 8.7–10.2)
CERULOPLASMIN SERPL-MCNC: 27.8 MG/DL (ref 16–31)
CHLORIDE SERPL-SCNC: 102 MMOL/L (ref 96–106)
CK SERPL-CCNC: 100 U/L (ref 24–204)
CO2 SERPL-SCNC: 24 MMOL/L (ref 20–29)
CREAT SERPL-MCNC: 1.24 MG/DL (ref 0.76–1.27)
FERRITIN SERPL-MCNC: 410 NG/ML (ref 30–400)
GGT SERPL-CCNC: 134 IU/L (ref 0–65)
GLOBULIN SER CALC-MCNC: 2.6 G/DL (ref 1.5–4.5)
GLUCOSE SERPL-MCNC: ABNORMAL MG/DL
HAV IGM SERPL QL IA: NEGATIVE
HBV CORE IGM SERPL QL IA: NEGATIVE
HBV SURFACE AG SERPL QL IA: NEGATIVE
HCV AB S/CO SERPL IA: <0.1 S/CO RATIO (ref 0–0.9)
IRON SATN MFR SERPL: 17 % (ref 15–55)
IRON SERPL-MCNC: 55 UG/DL (ref 38–169)
MITOCHONDRIA M2 IGG SER-ACNC: 20.5 UNITS (ref 0–20)
POTASSIUM SERPL-SCNC: ABNORMAL MMOL/L
PROT SERPL-MCNC: 7.3 G/DL (ref 6–8.5)
SODIUM SERPL-SCNC: 141 MMOL/L (ref 134–144)
TIBC SERPL-MCNC: 326 UG/DL (ref 250–450)
UIBC SERPL-MCNC: 271 UG/DL (ref 111–343)

## 2019-12-19 PROCEDURE — 93000 ELECTROCARDIOGRAM COMPLETE: CPT | Performed by: NURSE PRACTITIONER

## 2019-12-19 PROCEDURE — 99214 OFFICE O/P EST MOD 30 MIN: CPT | Performed by: NURSE PRACTITIONER

## 2019-12-19 NOTE — PROGRESS NOTES
Date of Office Visit: 2019  Encounter Provider: MITRA Ambrosio  Place of Service: [facility] CARDIOLOGY  Patient Name: Baron Abdullahi  :1970    Chief Complaint   Patient presents with   • Coronary Artery Disease     1 [facility] FOLLOW UP   • STEMI   :     HPI: Baron Abdullahi is a 49 y.o. male who presents today for follow-up.  Old records have been obtained and reviewed by me.  He is a patient without significant medical history who presented on 2019 with chest discomfort.  He was noted to have an inferior STEMI and he was air lifted here.  Cardiac catheterization revealed a normal left main, 40% proximal LAD, 60% mid left circumflex, subtotal occlusion of the distal RCA with 99%, and normal LV size and function.  He underwent PCI of the distal RCA extending into the PDA with a 3.0 x 33 mm Xience Lainey drug eluting stent post dilated with a 3.55 noncompliant balloon in the proximal segment and a 3.25 trek balloon in the mid to distal segment.  Recommendations were for dual-antiplatelet therapy for 1 year and consideration of a stress test as an outpatient to evaluate borderline circumflex stenosis.  Post procedure echocardiogram revealed mildly reduced LV function with an EF of 45%.  He was started on Toprol.  He also underwent a gallbladder ultrasound due to elevated liver enzymes which was negative.  His alkaline phosphatase was also elevated.  He was referred to GI outpatient.  On 12/10/2019, he was discharged and I am seeing him today for follow-up.   Since being discharged, he has been doing well from a cardiac standpoint.  He denies any chest pain, palpitations, edema, dizziness, or syncope.  He denies any bleeding difficulties or melena.  He continues to easily fatigue.  He also continues to have some mild shortness of breath with exertion but denies any PND or orthopnea.  He is reporting an ache in his right mid arm.  This has been ongoing since the  procedure.  He has been performing everyday activities around the house.  He has plans to start cardiac rehab in [facility].  [facility]  He is needing the cardiac rehab referral faxed to this facility.    Past Medical History:   Diagnosis Date   • Coronary artery disease involving native coronary artery of native heart without angina pectoris 2019   • Gout        Past Surgical History:   Procedure Laterality Date   • CARDIAC CATHETERIZATION N/A 2019    Procedure: Left Heart Cath;  Surgeon: Erasto Linton MD;  Location:  [facility]AS[facility];  Service: Cardiovascular   • CARDIAC CATHETERIZATION N/A 2019    Procedure: Coronary angiography;  Surgeon: Erasto Linton MD;  Location:  [facility];  Service: Cardiovascular   • CARDIAC CATHETERIZATION N/A 2019    Procedure: Stent ARIEL coronary;  Surgeon: Erasto Linton MD;  Location:  [facility]VAS[facility];  Service: Cardiovascular   • CARDIAC CATHETERIZATION N/A 2019    Procedure: Left ventriculography;  Surgeon: Erasto Linton MD;  Location:  [facility]ATH [facility]VAS[facility];  Service: Cardiovascular   • JOINT REPLACEMENT      Left       Social History     Socioeconomic History   • Marital status:      Spouse name: Not on file   • Number of children: Not on file   • Years of education: Not on file   • Highest education level: Not on file   Tobacco Use   • Smoking status: Former Smoker     Packs/day: 0.50     Years: 10.00     Pack years: 5.00     Types: Cigarettes     Start date: 1988     Last attempt to quit: 4/15/2010     Years since quittin.6   • Smokeless tobacco: Never Used   • Tobacco comment: CAFFEINE USE: 2 CUPS COFFEE OCCASSIONALLY   Substance and Sexual Activity   • Alcohol use: Not Currently     Alcohol/week: 35.0 standard drinks     Types: 15 Cans of beer, 20 Standard drinks or equivalent per week   • Drug use: Never   •  "Sexual activity: Yes     Partners: Female     Birth control/protection: None       Family History   Problem Relation Age of Onset   • Heart disease Father    • Heart failure Father    • Heart attack Maternal Grandfather    • Heart failure Maternal Grandfather        Review of Systems   Constitution: Positive for malaise/fatigue. Negative for chills and fever.   Cardiovascular: Positive for dyspnea on exertion. Negative for chest pain, leg swelling, near-syncope, orthopnea, palpitations, paroxysmal nocturnal dyspnea and syncope.   Respiratory: Negative for cough and shortness of breath.    Musculoskeletal: Negative for joint pain, joint swelling and myalgias.        Right arm ache   Gastrointestinal: Negative for abdominal pain, diarrhea, melena, nausea and vomiting.   Genitourinary: Negative for frequency and hematuria.   Neurological: Negative for light-headedness, numbness, paresthesias and seizures.   Allergic/Immunologic: Negative.    All other systems reviewed and are negative.      Allergies   Allergen Reactions   • Morphine Hives         Current Outpatient Medications:   •  aspirin 81 MG EC tablet, Take 1 tablet by mouth Daily., Disp: 30 tablet, Rfl: 11  •  atorvastatin (LIPITOR) 40 MG tablet, Take 1 tablet by mouth Every Night., Disp: 30 tablet, Rfl: 11  •  metoprolol succinate XL (TOPROL-XL) 25 MG 24 hr tablet, Take 1 tablet by mouth Daily., Disp: 30 tablet, Rfl: 11  •  prasugrel (EFFIENT) 10 MG tablet, Take 1 tablet by mouth Daily., Disp: 30 tablet, Rfl: 11  •  probenecid (BENEMID) 500 MG tablet, Take 500 mg by mouth 2 (Two) Times a Day., Disp: , Rfl:       Objective:     Vitals:    12/19/19 1104 12/19/19 1120   BP: 118/80 112/76   BP Location: Right arm Left arm   Patient Position: Sitting Sitting   Pulse: 72    Resp: 18    SpO2: 96%    Weight: 94 kg (207 lb 3.2 oz)    Height: 182.9 cm (72\")      Body mass index is 28.1 kg/m².    PHYSICAL EXAM:    Physical Exam   Constitutional: He is oriented to person, " place, and time. He appears well-developed and well-nourished. No distress.   HENT:   Head: Normocephalic and atraumatic.   Eyes: Pupils are equal, round, and reactive to light.   Neck: No JVD present. No thyromegaly present.   Cardiovascular: Normal rate, regular rhythm, normal heart sounds and intact distal pulses.   No murmur heard.  Pulmonary/Chest: Effort normal and breath sounds normal. No respiratory distress.   Abdominal: Soft. Bowel sounds are normal. He exhibits no distension. There is no splenomegaly or hepatomegaly. There is no tenderness.   Musculoskeletal: Normal range of motion. He exhibits no edema.   Neurological: He is alert and oriented to person, place, and time.   Skin: Skin is warm and dry. He is not diaphoretic. No erythema.   Radial site well healed without erythema or edema. Proximal and distal pulses palpable. Good capillary refill.   Psychiatric: He has a normal mood and affect. His behavior is normal. Judgment normal.         ECG 12 Lead  Date/Time: 12/19/2019 11:28 AM  Performed by: Elisa Carlos APRN  Authorized by: Elisa Carlos APRN   Comparison: compared with previous ECG from 12/9/2019  Similar to previous ECG  Rhythm: sinus rhythm  Rate: normal  BPM: 67  Q waves: II, III and aVF    T inversion: III and aVF    Clinical impression: abnormal EKG  Comments: Indication: Coronary artery disease  Q waves consistent with recent inferior MI              Assessment:       Diagnosis Plan   1. Coronary artery disease involving native coronary artery of native heart without angina pectoris  ECG 12 Lead   2. Acute MI, inferior wall (CMS/HCC)     3. Ischemic cardiomyopathy     4. Elevated liver enzymes       Orders Placed This Encounter   Procedures   • ECG 12 Lead     This order was created via procedure documentation          Plan:       1.  Coronary artery disease.  He denies any symptoms of angina.  Status post inferior STEMI and ARIEL to the distal RCA.  He will follow up with  Dr. Linton next month at which time we will consider a stress test to evaluate the circumflex lesion.  I am going to fax the cardiac rehab referral to the facility in [facility].  Continue aspirin, Effient, metoprolol, and atorvastatin.      2. Ischemic cardiomyopathy.  He denies any symptoms of heart failure.  Continue Toprol therapy.       3. Elevated liver enzymes.  He has followed up with Dr. Paula.  Lab work-up pending.  Has been advised to avoid Aleve and alcohol while pursuing workup.      Overall I think he is doing well from a cardiac standpoint.  He denies any symptoms of angina or heart failure.  I do not think the aching in his right arm is anything of concern.  He has great pulses and great capillary refill.  He has no loss of sensation.  I suspect this is more of a musculoskeletal or nerve issue.  I told him to let me know should he have any development of new symptoms.  Otherwise, I am not going to make any changes to his medical regimen, and he will follow-up with Dr. Linton on 1/13/2020 or sooner if needed.      As always, it has been a pleasure to participate in your patient's care.      Sincerely,         MITRA Casillas

## 2019-12-20 ENCOUNTER — TELEPHONE (OUTPATIENT)
Dept: CARDIOLOGY | Facility: CLINIC | Age: 49
End: 2019-12-20

## 2019-12-20 NOTE — TELEPHONE ENCOUNTER
Faxed all records to April w/ [facility] @ [phone]. Confirmation Received.     TRACI Calzada

## 2019-12-20 NOTE — TELEPHONE ENCOUNTER
----- Message from MITRA [place] sent at 12/19/2019 11:45 AM EST -----  Can you please fax the cardiac rehab referral, hospital discharge summary, echocardiogram report, and cardiac catheterization report to April at [facility]

## 2019-12-23 LAB
A1AT PHENOTYP SERPL IFE: NORMAL
A1AT SERPL-MCNC: 150 MG/DL (ref 101–187)

## 2019-12-26 ENCOUNTER — TELEPHONE (OUTPATIENT)
Dept: GASTROENTEROLOGY | Facility: CLINIC | Age: 49
End: 2019-12-26

## 2019-12-26 NOTE — TELEPHONE ENCOUNTER
----- Message from Marti Spence sent at 12/26/2019  2:35 PM EST -----  Regarding: Office Call  PCP Dr. Murguia requesting lab results.    [phone] fax

## 2020-01-02 ENCOUNTER — TELEPHONE (OUTPATIENT)
Dept: GASTROENTEROLOGY | Facility: CLINIC | Age: 50
End: 2020-01-02

## 2020-01-02 DIAGNOSIS — R74.8 ELEVATED ALKALINE PHOSPHATASE LEVEL: Primary | ICD-10-CM

## 2020-01-02 DIAGNOSIS — R74.8 ELEVATED LIVER ENZYMES: ICD-10-CM

## 2020-01-02 NOTE — TELEPHONE ENCOUNTER
----- Message from Uzma Paula MD sent at 1/2/2020  9:02 AM EST -----  His alkaline phosphatase remains elevated.  His antimitochondrial antibody is also slightly elevated as well, this can go along with the liver autoimmune disease (primary biliary cholangitis).    I would like to repeat the alkaline phosphatase and AMA in about 6 weeks.  If these are still elevated, then we will initiate treatment for likely PBC.    I have placed the lab orderes.      **Call to pt.  Advise of above.  Verb understanding.  Lab appt scheduled for 2/18 @ 8am.  Payal Stock RN.

## 2020-01-29 ENCOUNTER — OFFICE VISIT (OUTPATIENT)
Dept: CARDIOLOGY | Facility: CLINIC | Age: 50
End: 2020-01-29

## 2020-01-29 VITALS
WEIGHT: 204 LBS | SYSTOLIC BLOOD PRESSURE: 122 MMHG | HEART RATE: 65 BPM | DIASTOLIC BLOOD PRESSURE: 88 MMHG | BODY MASS INDEX: 27.63 KG/M2 | HEIGHT: 72 IN

## 2020-01-29 DIAGNOSIS — I25.5 ISCHEMIC CARDIOMYOPATHY: Primary | ICD-10-CM

## 2020-01-29 DIAGNOSIS — I25.10 CORONARY ARTERY DISEASE INVOLVING NATIVE CORONARY ARTERY OF NATIVE HEART WITHOUT ANGINA PECTORIS: ICD-10-CM

## 2020-01-29 DIAGNOSIS — R74.8 ELEVATED ALKALINE PHOSPHATASE LEVEL: ICD-10-CM

## 2020-01-29 PROCEDURE — 99214 OFFICE O/P EST MOD 30 MIN: CPT | Performed by: INTERNAL MEDICINE

## 2020-01-29 PROCEDURE — 93000 ELECTROCARDIOGRAM COMPLETE: CPT | Performed by: INTERNAL MEDICINE

## 2020-01-29 NOTE — PROGRESS NOTES
Baron Abdullahi  1970  Date of Office Visit: 01/29/20  Encounter Provider: Erasto Linton MD  Place of Service: [facility] CARDIOLOGY      CHIEF COMPLAINT:  Inferior ST elevation MI  Coronary artery disease  Ischemic cardiomyopathy    HISTORY OF PRESENT ILLNESS:Dr. Murguia,     I had the pleasure of seeing this patient back in followup after his hospitalization with a myocardial infarction in 12/2019. He presented via [facility] with an inferior STEMI on 12/08/2019. He underwent cardiac catheterization and had a subtotally occluded distal RCA. He underwent PCI to the distal RCA with a 3.0 x 33 mm Xience Lainey drug-eluting stent, post dilated to high pressure. He had distal cutoff at the [facility] and subsequently received intracoronary integrelin overnight. His post procedure echocardiogram revealed a low normal ejection fraction of around 45% to 50%. He had no significant valvular heart disease. He did undergo a gallbladder ultrasound and GI evaluation due to an elevated alkaline phosphatase and has followed up with GI. His circumflex was noted to have around 50% to 60% discrete stenosis in the mid segment.     Since his discharge he has been doing very well. He denies any chest pain or dyspnea on exertion. He is tolerating his current medical regimen. He had mild fatigue for the first 1-2 weeks after his discharge but this has resolved.     Review of Systems   Constitution: Negative for fever and malaise/fatigue.   HENT: Negative for nosebleeds and sore throat.    Eyes: Negative for blurred vision and double vision.   Cardiovascular: Negative for chest pain, claudication, palpitations and syncope.   Respiratory: Negative for cough, shortness of breath and snoring.    Endocrine: Negative for cold intolerance, heat intolerance and polydipsia.   Skin: Negative for itching, poor wound healing and rash.   Musculoskeletal: Negative for joint pain, joint swelling, muscle weakness and  "myalgias.   Gastrointestinal: Negative for abdominal pain, melena, nausea and vomiting.   Neurological: Negative for light-headedness, loss of balance, seizures, vertigo and weakness.   Psychiatric/Behavioral: Negative for altered mental status and depression.       Past Medical History:   Diagnosis Date   • Coronary artery disease involving native coronary artery of native heart without angina pectoris 12/18/2019   • Gout        The following portions of the patient's history were reviewed and updated as appropriate: Social history , Family history and Surgical history     Current Outpatient Medications on File Prior to Visit   Medication Sig Dispense Refill   • aspirin 81 MG EC tablet Take 1 tablet by mouth Daily. 30 tablet 11   • atorvastatin (LIPITOR) 40 MG tablet Take 1 tablet by mouth Every Night. 30 tablet 11   • metoprolol succinate XL (TOPROL-XL) 25 MG 24 hr tablet Take 1 tablet by mouth Daily. 30 tablet 11   • prasugrel (EFFIENT) 10 MG tablet Take 1 tablet by mouth Daily. 30 tablet 11   • probenecid (BENEMID) 500 MG tablet Take 500 mg by mouth 2 (Two) Times a Day.       No current facility-administered medications on file prior to visit.        Allergies   Allergen Reactions   • Morphine Hives       Vitals:    01/29/20 1236   BP: 122/88   Pulse: 65   Weight: 92.5 kg (204 lb)   Height: 182.9 cm (72\")     Physical Exam   Constitutional: He is oriented to person, place, and time. He appears well-developed and well-nourished.   HENT:   Head: Normocephalic and atraumatic.   Eyes: Conjunctivae and EOM are normal. No scleral icterus.   Neck: Normal range of motion. Neck supple. Normal carotid pulses, no hepatojugular reflux and no JVD present. Carotid bruit is not present. No tracheal deviation present. No thyromegaly present.   Cardiovascular: Normal rate and regular rhythm. Exam reveals no gallop and no friction rub.   No murmur heard.  Pulses:       Carotid pulses are 2+ on the right side, and 2+ on the left " side.       Radial pulses are 2+ on the right side, and 2+ on the left side.        Femoral pulses are 2+ on the right side, and 2+ on the left side.       Dorsalis pedis pulses are 2+ on the right side, and 2+ on the left side.        Posterior tibial pulses are 2+ on the right side, and 2+ on the left side.   Pulmonary/Chest: Breath sounds normal. No respiratory distress. He has no decreased breath sounds. He has no wheezes. He has no rhonchi. He has no rales. He exhibits no tenderness.   Abdominal: Soft. Bowel sounds are normal. He exhibits no distension. There is no tenderness. There is no rebound.   Musculoskeletal: He exhibits no edema or deformity.   Neurological: He is alert and oriented to person, place, and time. He has normal strength. No sensory deficit.   Skin: No rash noted. No erythema.   Psychiatric: He has a normal mood and affect. His behavior is normal.       Lab Results   Component Value Date    WBC 8.51 12/09/2019    HGB 13.6 12/09/2019    HCT 38.4 12/09/2019    MCV 90.4 12/09/2019     12/09/2019       Lab Results   Component Value Date    GLUCOSE 94 12/09/2019    BUN 14 12/18/2019    CREATININE 1.24 12/18/2019    EGFRIFNONA 68 12/18/2019    EGFRIFAFRI 78 12/18/2019    BCR 11 12/18/2019    K CANCELED 12/18/2019    CO2 24 12/18/2019    CALCIUM 9.5 12/18/2019    PROTENTOTREF 7.3 12/18/2019    ALBUMIN 4.7 12/18/2019    LABIL2 1.8 12/18/2019    AST 29 12/18/2019    ALT 33 12/18/2019       Lab Results   Component Value Date    GLUCOSE 94 12/09/2019    CALCIUM 9.5 12/18/2019     12/18/2019    K CANCELED 12/18/2019    CO2 24 12/18/2019     12/18/2019    BUN 14 12/18/2019    CREATININE 1.24 12/18/2019    EGFRIFAFRI 78 12/18/2019    EGFRIFNONA 68 12/18/2019    BCR 11 12/18/2019    ANIONGAP 12.0 12/09/2019       Lab Results   Component Value Date    CHOL 161 12/09/2019    TRIG 124 12/09/2019    HDL 35 (L) 12/09/2019     (H) 12/09/2019         ECG 12 Lead  Date/Time: 1/29/2020  1:52 PM  Performed by: Erasto Linton MD  Authorized by: Erasto Linton MD   Comparison: compared with previous ECG from 12/19/2019  Similar to previous ECG  Rhythm: sinus rhythm  Rate: normal  QRS axis: left    Clinical impression: abnormal EKG  Comments: Inferior infarct.  RSR prime pattern.             Results for orders placed during the hospital encounter of 12/08/19   Echocardiogram 2D complete    Narrative · Estimated EF = 45%. Quantified left ventricular ejection fraction 49%.  · Left ventricular systolic function is low normal.  · The following left ventricular wall segments are mildly hypokinetic:   basal inferolateral, mid inferolateral and basal inferior.        12/8/19  Conclusions:   1. Left main: Normal  2. LAD: 40% proximal stenosis.  3. LCX: Discrete 60% mid vessel stenosis.  4. RCA: Subtotally occluded in the distal segment with a diffuse 99% distal RCA stenosis.  30% PDA stenosis.  5.  Normal left ventricular size and systolic function  6.  Successful PCI of the distal RCA extending into the PDA with a 3.0 x 33 mm Xience Lainey drug-eluting stent, postdilated with a 3.55 noncompliant balloon in the proximal stent segment and a 3.25 noncompliant trek balloon in the mid to distal stent segment.    DISCUSSION/SUMMARYA very pleasant 49-year-old male who presented in 12/2019 with an inferior STEMI. He underwent PCI of the distal RCA with 1 drug-eluting stent, post dilated to high pressure. He tolerated this well. He had a mildly decreased ejection fraction of around 45% to 50%, with a wall motion abnormality consistent with a myocardial infarction.    He has been doing very well since his discharge and tolerating his day-to-day level of activity along with cardiac rehabilitation.    1. Coronary artery disease with history of inferior STEMI.  - Prior drug-eluting stent placement to the RCA as documented above.  - Continue aspirin lifelong and pressor over 1 year.   - Continue  atorvastatin at current dose.   2. Hyperlipidemia.  - LDL level mildly elevated at 101. I think it is reasonable to continue the atorvastatin at only 40 mg daily.   3. Ischemic cardiomyopathy: Ejection fraction low normal to mildly depressed at 45% to 50%.    - I do not think there is an indication at this point in time to start an Ace inhibitor and his blood pressure would be unlikely to tolerate it.   - I will continue him on Toprol therapy.   - No indication for diuretic.      I will see him back in 6 months.

## 2020-02-03 ENCOUNTER — RESULTS ENCOUNTER (OUTPATIENT)
Dept: GASTROENTEROLOGY | Facility: CLINIC | Age: 50
End: 2020-02-03

## 2020-02-03 DIAGNOSIS — R74.8 ELEVATED LIVER ENZYMES: ICD-10-CM

## 2020-02-03 DIAGNOSIS — R74.8 ELEVATED ALKALINE PHOSPHATASE LEVEL: ICD-10-CM

## 2020-02-18 LAB
ALBUMIN SERPL-MCNC: 4.4 G/DL (ref 3.5–5.2)
ALBUMIN/GLOB SERPL: 1.6 G/DL
ALP SERPL-CCNC: 343 U/L (ref 39–117)
ALT SERPL-CCNC: 26 U/L (ref 1–41)
AST SERPL-CCNC: 26 U/L (ref 1–40)
BILIRUB SERPL-MCNC: 0.6 MG/DL (ref 0.2–1.2)
BUN SERPL-MCNC: 9 MG/DL (ref 6–20)
BUN/CREAT SERPL: 8 (ref 7–25)
CALCIUM SERPL-MCNC: 9.3 MG/DL (ref 8.6–10.5)
CHLORIDE SERPL-SCNC: 102 MMOL/L (ref 98–107)
CHOLEST SERPL-MCNC: 136 MG/DL (ref 0–200)
CHOLEST/HDLC SERPL: 3.78 {RATIO}
CO2 SERPL-SCNC: 25.2 MMOL/L (ref 22–29)
CREAT SERPL-MCNC: 1.12 MG/DL (ref 0.76–1.27)
GLOBULIN SER CALC-MCNC: 2.7 GM/DL
GLUCOSE SERPL-MCNC: 92 MG/DL (ref 65–99)
HDLC SERPL-MCNC: 36 MG/DL (ref 40–60)
LDLC SERPL CALC-MCNC: 77 MG/DL (ref 0–100)
POTASSIUM SERPL-SCNC: 4.3 MMOL/L (ref 3.5–5.2)
PROT SERPL-MCNC: 7.1 G/DL (ref 6–8.5)
SODIUM SERPL-SCNC: 141 MMOL/L (ref 136–145)
TRIGL SERPL-MCNC: 114 MG/DL (ref 0–150)
URATE SERPL-MCNC: 4.3 MG/DL (ref 3.4–7)
VLDLC SERPL CALC-MCNC: 22.8 MG/DL

## 2020-02-19 LAB — MITOCHONDRIA M2 IGG SER-ACNC: 20.6 UNITS (ref 0–20)

## 2020-02-20 ENCOUNTER — TELEPHONE (OUTPATIENT)
Dept: GASTROENTEROLOGY | Facility: CLINIC | Age: 50
End: 2020-02-20

## 2020-02-20 RX ORDER — URSODIOL 300 MG/1
600 CAPSULE ORAL 2 TIMES DAILY WITH MEALS
Qty: 120 CAPSULE | Refills: 4 | Status: SHIPPED | OUTPATIENT
Start: 2020-02-20 | End: 2020-07-13

## 2020-02-20 NOTE — TELEPHONE ENCOUNTER
Attempt call to # given - nonworking #.    Call to [employer] @ [phone] and spoke with Yen.  Requesting clarification on Ursodiol.  States max dose listed is 900 mg/day, so needs to verify that 300 mg 2 capsule 2x/day (1200 mg total) is correct.    Question to DR Paula.

## 2020-02-20 NOTE — TELEPHONE ENCOUNTER
----- Message from Uzma Paula MD sent at 2/20/2020  1:51 PM EST -----  His alkaline phosphatase and antimitochondrial antibody remains elevated.    I am going to start him on ursodeoxycholic acid to treat this and we will repeat his labs at his upcoming visit in March.

## 2020-02-20 NOTE — PROGRESS NOTES
His alkaline phosphatase and antimitochondrial antibody remains elevated.    I am going to start him on ursodeoxycholic acid to treat this and we will repeat his labs at his upcoming visit in March.

## 2020-02-20 NOTE — TELEPHONE ENCOUNTER
----- Message from Xiomara Carpenter sent at 2/20/2020  2:41 PM EST -----  Regarding: Rx   Contact: [phone]  Pharmacist needs to verify the direction of the dosis is correct. Call back [facility] pharmacy  [phone]

## 2020-02-24 NOTE — TELEPHONE ENCOUNTER
Call to pt.  Advise per DR Paula note.  Verb understanding.      Asking if needs to modify diet/beverage intake - or can he continue as always.     Question to DR Paula.

## 2020-04-27 ENCOUNTER — RESULTS ENCOUNTER (OUTPATIENT)
Dept: GASTROENTEROLOGY | Facility: CLINIC | Age: 50
End: 2020-04-27

## 2020-04-27 ENCOUNTER — TELEPHONE (OUTPATIENT)
Dept: GASTROENTEROLOGY | Facility: CLINIC | Age: 50
End: 2020-04-27

## 2020-04-27 DIAGNOSIS — R74.8 ELEVATED LIVER ENZYMES: ICD-10-CM

## 2020-04-27 DIAGNOSIS — R74.8 ELEVATED ALKALINE PHOSPHATASE LEVEL: ICD-10-CM

## 2020-04-27 DIAGNOSIS — R74.8 ELEVATED LIVER ENZYMES: Primary | ICD-10-CM

## 2020-04-27 NOTE — TELEPHONE ENCOUNTER
----- Message from Baron Abdullahi sent at 4/27/2020  1:23 PM EDT -----  Regarding: Visit Follow-Up Question  Contact: [phone]  I just scheduled a follow up visit for 5/26/20 at 2pm. My question is will you draw blood at this visit to check to see how the medication has worked for my high liver enzymes? Does it need to be re-scheduled at an earlier time for fasting purposes?  Please advise.  Thanks,

## 2020-04-27 NOTE — TELEPHONE ENCOUNTER
Called pt and advised that Dr Paula would like him to come in and have labs done.  Pt verb understanding and made lab appt for 05/18 at 8a.

## 2020-05-02 ENCOUNTER — RESULTS ENCOUNTER (OUTPATIENT)
Dept: GASTROENTEROLOGY | Facility: CLINIC | Age: 50
End: 2020-05-02

## 2020-05-02 DIAGNOSIS — R74.8 ELEVATED ALKALINE PHOSPHATASE LEVEL: ICD-10-CM

## 2020-05-02 DIAGNOSIS — R74.8 ELEVATED LIVER ENZYMES: ICD-10-CM

## 2020-05-18 LAB
25(OH)D3+25(OH)D2 SERPL-MCNC: 26.3 NG/ML (ref 30–100)
ALBUMIN SERPL-MCNC: 4.5 G/DL (ref 3.5–5.2)
ALBUMIN/GLOB SERPL: 1.8 G/DL
ALP SERPL-CCNC: 291 U/L (ref 39–117)
ALT SERPL-CCNC: 17 U/L (ref 1–41)
AST SERPL-CCNC: 24 U/L (ref 1–40)
BASOPHILS # BLD AUTO: 0.04 10*3/MM3 (ref 0–0.2)
BASOPHILS NFR BLD AUTO: 0.7 % (ref 0–1.5)
BILIRUB SERPL-MCNC: 0.5 MG/DL (ref 0.2–1.2)
BUN SERPL-MCNC: 14 MG/DL (ref 6–20)
BUN/CREAT SERPL: 12.1 (ref 7–25)
CALCIUM SERPL-MCNC: 9.6 MG/DL (ref 8.6–10.5)
CHLORIDE SERPL-SCNC: 101 MMOL/L (ref 98–107)
CO2 SERPL-SCNC: 26.3 MMOL/L (ref 22–29)
CREAT SERPL-MCNC: 1.16 MG/DL (ref 0.76–1.27)
EOSINOPHIL # BLD AUTO: 0.24 10*3/MM3 (ref 0–0.4)
EOSINOPHIL NFR BLD AUTO: 4 % (ref 0.3–6.2)
ERYTHROCYTE [DISTWIDTH] IN BLOOD BY AUTOMATED COUNT: 12.1 % (ref 12.3–15.4)
GGT SERPL-CCNC: 35 U/L (ref 8–61)
GLOBULIN SER CALC-MCNC: 2.5 GM/DL
GLUCOSE SERPL-MCNC: 103 MG/DL (ref 65–99)
HCT VFR BLD AUTO: 43.8 % (ref 37.5–51)
HGB BLD-MCNC: 15 G/DL (ref 13–17.7)
IMM GRANULOCYTES # BLD AUTO: 0.02 10*3/MM3 (ref 0–0.05)
IMM GRANULOCYTES NFR BLD AUTO: 0.3 % (ref 0–0.5)
LYMPHOCYTES # BLD AUTO: 1.78 10*3/MM3 (ref 0.7–3.1)
LYMPHOCYTES NFR BLD AUTO: 29.9 % (ref 19.6–45.3)
MCH RBC QN AUTO: 31.1 PG (ref 26.6–33)
MCHC RBC AUTO-ENTMCNC: 34.2 G/DL (ref 31.5–35.7)
MCV RBC AUTO: 90.9 FL (ref 79–97)
MONOCYTES # BLD AUTO: 0.59 10*3/MM3 (ref 0.1–0.9)
MONOCYTES NFR BLD AUTO: 9.9 % (ref 5–12)
NEUTROPHILS # BLD AUTO: 3.28 10*3/MM3 (ref 1.7–7)
NEUTROPHILS NFR BLD AUTO: 55.2 % (ref 42.7–76)
NRBC BLD AUTO-RTO: 0 /100 WBC (ref 0–0.2)
PLATELET # BLD AUTO: 217 10*3/MM3 (ref 140–450)
POTASSIUM SERPL-SCNC: 4.3 MMOL/L (ref 3.5–5.2)
PROT SERPL-MCNC: 7 G/DL (ref 6–8.5)
RBC # BLD AUTO: 4.82 10*6/MM3 (ref 4.14–5.8)
SODIUM SERPL-SCNC: 139 MMOL/L (ref 136–145)
WBC # BLD AUTO: 5.95 10*3/MM3 (ref 3.4–10.8)

## 2020-05-19 LAB — MITOCHONDRIA M2 IGG SER-ACNC: 20.4 UNITS (ref 0–20)

## 2020-05-20 ENCOUNTER — TELEPHONE (OUTPATIENT)
Dept: GASTROENTEROLOGY | Facility: CLINIC | Age: 50
End: 2020-05-20

## 2020-05-20 RX ORDER — OMEGA-3S/DHA/EPA/FISH OIL/D3 300MG-1000
800 CAPSULE ORAL DAILY
Qty: 180 TABLET | Refills: 1 | Status: SHIPPED | OUTPATIENT
Start: 2020-05-20 | End: 2021-02-02

## 2020-05-20 NOTE — TELEPHONE ENCOUNTER
Call to pt.  Advise per Dr Paula note.  Verb understanding.     Vit D not noted in med list -  Message to DR Paula.

## 2020-05-20 NOTE — TELEPHONE ENCOUNTER
----- Message from Uzma Paula MD sent at 5/20/2020 11:25 AM EDT -----  His vitamin D level is low.  I am going to send him supplementation.    Blood count is normal.    Antimitochondrial antibody remains very very minimally elevated.    Alkaline phosphatase level is down to 291 which is improved from his last visit, his GGT level is normal which indicates the liver is doing well.      AST and ALT down to normal levels.    Will see him at 5/26 appt

## 2020-05-20 NOTE — PROGRESS NOTES
His vitamin D level is low.  I am going to send him supplementation.    Blood count is normal.    Antimitochondrial antibody remains very very minimally elevated.    Alkaline phosphatase level is down to 291 which is improved from his last visit, his GGT level is normal which indicates the liver is doing well.      AST and ALT down to normal levels.    Will see him at 5/26 appt

## 2020-05-26 ENCOUNTER — OFFICE VISIT (OUTPATIENT)
Dept: GASTROENTEROLOGY | Facility: CLINIC | Age: 50
End: 2020-05-26

## 2020-05-26 VITALS — HEIGHT: 72 IN | BODY MASS INDEX: 27.63 KG/M2 | TEMPERATURE: 97.8 F | WEIGHT: 204 LBS

## 2020-05-26 DIAGNOSIS — R74.8 ELEVATED ALKALINE PHOSPHATASE LEVEL: Primary | ICD-10-CM

## 2020-05-26 DIAGNOSIS — E55.9 HYPOVITAMINOSIS D: ICD-10-CM

## 2020-05-26 DIAGNOSIS — R74.8 ELEVATED LIVER ENZYMES: ICD-10-CM

## 2020-05-26 DIAGNOSIS — K74.3 PRIMARY BILIARY CHOLANGITIS (HCC): ICD-10-CM

## 2020-05-26 PROCEDURE — 99214 OFFICE O/P EST MOD 30 MIN: CPT | Performed by: INTERNAL MEDICINE

## 2020-05-26 NOTE — PROGRESS NOTES
Chief Complaint   Patient presents with   • Elevated Hepatic Enzymes     Subjective     HPI  Baron Abdullahi is a 49 y.o. male who presents for follow up of elevated liver enzymes, presumed PBC, low vitamin D levels.    He says he is feeling well following staring the vitamin D supplementation.  Prior to that, he was having fatigue, muscle aches.  He had been attributing it to the ursodiol and was considering stopping the medication.  However, he says he is now feeling back to his usual self, as good as he felt after he got his stents placed.    He says he has significantly decreased alcohol use.  He says he drinks less than 1 beer a day.  He has been drinking nonalcoholic beers.    His alkaline phosphatase on recent labs remains elevated though the GGT has now normalized.  His antimitochondrial antibody remains very minimally elevated.    Today's visit was in the office.  Both the patient and I were wearing face masks and proper hand hygiene was performed before and after the physical exam.     Past Medical History:   Diagnosis Date   • Coronary artery disease involving native coronary artery of native heart without angina pectoris 12/18/2019   • Gout        Social History     Socioeconomic History   • Marital status:      Spouse name: Not on file   • Number of children: Not on file   • Years of education: Not on file   • Highest education level: Not on file   Tobacco Use   • Smoking status: Former Smoker     Packs/day: 0.50     Years: 10.00     Pack years: 5.00     Types: Cigarettes     Start date: 7/1/1988     Last attempt to quit: 4/15/2010     Years since quitting: 10.1   • Smokeless tobacco: Never Used   • Tobacco comment: on and off smoker and chew until quit date   Substance and Sexual Activity   • Alcohol use: Yes     Alcohol/week: 35.0 standard drinks     Types: 15 Cans of beer, 20 Standard drinks or equivalent per week   • Drug use: Never   • Sexual activity: Yes     Partners: Female     Birth  "control/protection: None         Current Outpatient Medications:   •  aspirin 81 MG EC tablet, Take 1 tablet by mouth Daily., Disp: 30 tablet, Rfl: 11  •  atorvastatin (LIPITOR) 40 MG tablet, Take 1 tablet by mouth Every Night., Disp: 30 tablet, Rfl: 11  •  cholecalciferol (VITAMIN D3) 10 MCG (400 UNIT) tablet, Take 2 tablets by mouth Daily., Disp: 180 tablet, Rfl: 1  •  metoprolol succinate XL (TOPROL-XL) 25 MG 24 hr tablet, Take 1 tablet by mouth Daily., Disp: 30 tablet, Rfl: 11  •  prasugrel (EFFIENT) 10 MG tablet, Take 1 tablet by mouth Daily., Disp: 30 tablet, Rfl: 11  •  probenecid (BENEMID) 500 MG tablet, Take 500 mg by mouth 2 (Two) Times a Day., Disp: , Rfl:   •  ursodiol (ACTIGALL) 300 MG capsule, Take 2 capsules by mouth 2 (Two) Times a Day With Meals., Disp: 120 capsule, Rfl: 4    Review of Systems   Constitutional: Positive for fatigue. Negative for activity change and appetite change.   HENT: Negative for sore throat and trouble swallowing.    Respiratory: Negative.    Cardiovascular: Negative.    Gastrointestinal: Negative for abdominal distention, abdominal pain and blood in stool.   Endocrine: Negative for cold intolerance and heat intolerance.   Genitourinary: Negative for difficulty urinating, dysuria and frequency.   Musculoskeletal: Positive for arthralgias and myalgias. Negative for back pain.   Skin: Negative.    Hematological: Negative for adenopathy. Does not bruise/bleed easily.   All other systems reviewed and are negative.      Objective   Vitals:    05/26/20 1410   Temp: 97.8 °F (36.6 °C)         05/26/20  1410   Weight: 92.5 kg (204 lb)     Body mass index is 27.67 kg/m².          Temp 97.8 °F (36.6 °C)   Ht 182.9 cm (72\")   Wt 92.5 kg (204 lb)   BMI 27.67 kg/m²     General Appearance:  Alert, cooperative, no distress, appears stated age   Head:  Normocephalic, without obvious abnormality, atraumatic   Eyes:  PERRL, conjunctiva/corneas clear, EOM's intact   Ears:  Normal external " appearance of ear, hearing intact   Nose: Nares normal,mucosa normal, no drainage or sinus tenderness   Throat: Lips, mucosa, and tongue normal; teeth and gums normal   Neck: Supple, symmetrical, trachea midline, no adenopathy;  thyroid: not enlarged, symmetric, no tenderness/mass/nodule   Back:   Symmetric, no curvature, ROM normal   Lungs:   Clear to auscultation bilaterally, respirations unlabored, effort normal   Heart:  Regular rate and rhythm, S1 and S2 normal, no murmur, rub, or gallop, no lower extremity edema   Abdomen:   Soft, non-tender, bowel sounds active all four quadrants,  no masses, no organomegaly   Rectal: Deferred   Musculoskeletal: Normal gait, normal station, no atrophy of extremities, normal strength and tone   Skin: Normal color, texture, and turgor, no rashes or lesions   Lymph nodes: Cervical and supraclavicular nodes normal   Psychiatric: Alert and oriented x 3, normal mood and affect,  normal recent and remote memory, normal judgment and insight         WBC   Date Value Ref Range Status   05/18/2020 5.95 3.40 - 10.80 10*3/mm3 Final     RBC   Date Value Ref Range Status   05/18/2020 4.82 4.14 - 5.80 10*6/mm3 Final     Hemoglobin   Date Value Ref Range Status   05/18/2020 15.0 13.0 - 17.7 g/dL Final   12/09/2019 13.6 13.0 - 17.7 g/dL Final     Hematocrit   Date Value Ref Range Status   05/18/2020 43.8 37.5 - 51.0 % Final   12/09/2019 38.4 37.5 - 51.0 % Final     MCV   Date Value Ref Range Status   05/18/2020 90.9 79.0 - 97.0 fL Final   12/09/2019 90.4 79.0 - 97.0 fL Final     MCH   Date Value Ref Range Status   05/18/2020 31.1 26.6 - 33.0 pg Final   12/09/2019 32.0 26.6 - 33.0 pg Final     MCHC   Date Value Ref Range Status   05/18/2020 34.2 31.5 - 35.7 g/dL Final   12/09/2019 35.4 31.5 - 35.7 g/dL Final     RDW   Date Value Ref Range Status   05/18/2020 12.1 (L) 12.3 - 15.4 % Final   12/09/2019 11.9 (L) 12.3 - 15.4 % Final     RDW-SD   Date Value Ref Range Status   12/09/2019 38.8 37.0 -  54.0 fl Final     MPV   Date Value Ref Range Status   12/09/2019 10.4 6.0 - 12.0 fL Final     Platelets   Date Value Ref Range Status   05/18/2020 217 140 - 450 10*3/mm3 Final   12/09/2019 191 140 - 450 10*3/mm3 Final     Neutrophil Rel %   Date Value Ref Range Status   05/18/2020 55.2 42.7 - 76.0 % Final     Neutrophil %   Date Value Ref Range Status   12/08/2019 54.9 42.7 - 76.0 % Final     Lymphocyte Rel %   Date Value Ref Range Status   05/18/2020 29.9 19.6 - 45.3 % Final     Lymphocyte %   Date Value Ref Range Status   12/08/2019 29.5 19.6 - 45.3 % Final     Monocyte Rel %   Date Value Ref Range Status   05/18/2020 9.9 5.0 - 12.0 % Final     Monocyte %   Date Value Ref Range Status   12/08/2019 9.3 5.0 - 12.0 % Final     Eosinophil Rel %   Date Value Ref Range Status   05/18/2020 4.0 0.3 - 6.2 % Final     Eosinophil %   Date Value Ref Range Status   12/08/2019 4.4 0.3 - 6.2 % Final     Basophil Rel %   Date Value Ref Range Status   05/18/2020 0.7 0.0 - 1.5 % Final     Basophil %   Date Value Ref Range Status   12/08/2019 1.1 0.0 - 1.5 % Final     Immature Grans %   Date Value Ref Range Status   12/08/2019 0.8 (H) 0.0 - 0.5 % Final     Neutrophils Absolute   Date Value Ref Range Status   05/18/2020 3.28 1.70 - 7.00 10*3/mm3 Final     Neutrophils, Absolute   Date Value Ref Range Status   12/08/2019 3.47 1.70 - 7.00 10*3/mm3 Final     Lymphocytes Absolute   Date Value Ref Range Status   05/18/2020 1.78 0.70 - 3.10 10*3/mm3 Final     Lymphocytes, Absolute   Date Value Ref Range Status   12/08/2019 1.87 0.70 - 3.10 10*3/mm3 Final     Monocytes Absolute   Date Value Ref Range Status   05/18/2020 0.59 0.10 - 0.90 10*3/mm3 Final     Monocytes, Absolute   Date Value Ref Range Status   12/08/2019 0.59 0.10 - 0.90 10*3/mm3 Final     Eosinophils Absolute   Date Value Ref Range Status   05/18/2020 0.24 0.00 - 0.40 10*3/mm3 Final     Eosinophils, Absolute   Date Value Ref Range Status   12/08/2019 0.28 0.00 - 0.40 10*3/mm3  Final     Basophils Absolute   Date Value Ref Range Status   05/18/2020 0.04 0.00 - 0.20 10*3/mm3 Final     Basophils, Absolute   Date Value Ref Range Status   12/08/2019 0.07 0.00 - 0.20 10*3/mm3 Final     Immature Grans, Absolute   Date Value Ref Range Status   12/08/2019 0.05 0.00 - 0.05 10*3/mm3 Final     nRBC   Date Value Ref Range Status   05/18/2020 0.0 0.0 - 0.2 /100 WBC Final   12/08/2019 0.0 0.0 - 0.2 /100 WBC Final       Lab Results   Component Value Date    GLUCOSE 94 12/09/2019    BUN 14 05/18/2020    CREATININE 1.16 05/18/2020    EGFRIFNONA 67 05/18/2020    EGFRIFAFRI 81 05/18/2020    BCR 12.1 05/18/2020    CO2 26.3 05/18/2020    CALCIUM 9.6 05/18/2020    PROTENTOTREF 7.0 05/18/2020    ALBUMIN 4.50 05/18/2020    LABIL2 1.8 05/18/2020    AST 24 05/18/2020    ALT 17 05/18/2020         Imaging Results (Last 7 Days)     ** No results found for the last 168 hours. **            Assessment/Plan    1.  Elevated alkaline phosphatase level: Slightly better but still high.  His GGT has normalized.  He reports a history of having chronically elevated alkaline phosphatase.  At this point, I am concerned that this is not of liver origin given that the level is still elevated though the GGT is not.    2.  Primary biliary cholangitis: Presumed on the basis of his elevated alkaline phosphatase level as well as the mildly elevated AMA.  Now I am not so certain given that his GGT level has declined but his alkaline phosphatase has not    3.  Low vitamin D level: It seems like he was quite symptomatic from this and is now feeling better with supplementation.  This would go hand-in-hand with PBC.    4.  Elevated transaminase levels: Improved, suspected secondary to alcohol abuse    Plan  Continue ursodiol  Continue vitamin D supplementation  We will plan to repeat his CMP, GGT in 2 months.  If the GGT remains low but alkaline phosphatase remains elevated; then I think the next step is sending him to an endocrinologist to  rule out any kind of bone disorder causing the elevated alkaline phosphatase; of note, his calcium level is normal and he denies any history of thyroid disease.  I think at that stage, it would be safe to do a trial off of the ursodiol to see if there is any significant changes in his GGT level as long as he continues to minimize his alcohol usage.  I discussed that we would be doing a trial because it is unsafe to attempt a liver biopsy at this time due to his need for Effient through December of this year.    I will plan on repeating his labs in 2 months, further recommendations at that time    I advised to call the office if he continues the vitamin D supplementation but has recurrence of the myalgias, fatigue and overall poor symptoms which would be an indication to go ahead and stop the ursodiol.     was seen today for elevated hepatic enzymes.    Diagnoses and all orders for this visit:    Elevated alkaline phosphatase level  -     Gamma GT; Future  -     Comprehensive Metabolic Panel; Future  -     Vitamin D 25 Hydroxy; Future    Elevated liver enzymes    Primary biliary cholangitis (CMS/HCC)  -     Gamma GT; Future  -     Comprehensive Metabolic Panel; Future  -     Vitamin D 25 Hydroxy; Future    Hypovitaminosis D  -     Gamma GT; Future  -     Comprehensive Metabolic Panel; Future  -     Vitamin D 25 Hydroxy; Future        Dictated utilizing Dragon dictation

## 2020-07-13 RX ORDER — URSODIOL 300 MG/1
600 CAPSULE ORAL 2 TIMES DAILY WITH MEALS
Qty: 360 CAPSULE | Refills: 1 | Status: SHIPPED | OUTPATIENT
Start: 2020-07-13 | End: 2021-10-25 | Stop reason: ALTCHOICE

## 2020-07-15 ENCOUNTER — LAB (OUTPATIENT)
Dept: GASTROENTEROLOGY | Facility: CLINIC | Age: 50
End: 2020-07-15

## 2020-07-15 LAB
25(OH)D3+25(OH)D2 SERPL-MCNC: 49.2 NG/ML (ref 30–100)
ALBUMIN SERPL-MCNC: 4.8 G/DL (ref 3.5–5.2)
ALBUMIN/GLOB SERPL: 1.8 G/DL
ALP SERPL-CCNC: 301 U/L (ref 39–117)
ALT SERPL-CCNC: 17 U/L (ref 1–41)
AST SERPL-CCNC: 23 U/L (ref 1–40)
BILIRUB SERPL-MCNC: 0.6 MG/DL (ref 0–1.2)
BUN SERPL-MCNC: 11 MG/DL (ref 6–20)
BUN/CREAT SERPL: 9.1 (ref 7–25)
CALCIUM SERPL-MCNC: 9.5 MG/DL (ref 8.6–10.5)
CHLORIDE SERPL-SCNC: 104 MMOL/L (ref 98–107)
CO2 SERPL-SCNC: 26.7 MMOL/L (ref 22–29)
CREAT SERPL-MCNC: 1.21 MG/DL (ref 0.76–1.27)
GGT SERPL-CCNC: 36 U/L (ref 8–61)
GLOBULIN SER CALC-MCNC: 2.6 GM/DL
GLUCOSE SERPL-MCNC: 101 MG/DL (ref 65–99)
POTASSIUM SERPL-SCNC: 4.6 MMOL/L (ref 3.5–5.2)
PROT SERPL-MCNC: 7.4 G/DL (ref 6–8.5)
SODIUM SERPL-SCNC: 141 MMOL/L (ref 136–145)

## 2020-07-16 ENCOUNTER — TELEPHONE (OUTPATIENT)
Dept: GASTROENTEROLOGY | Facility: CLINIC | Age: 50
End: 2020-07-16

## 2020-07-16 DIAGNOSIS — R74.8 ELEVATED ALKALINE PHOSPHATASE LEVEL: Primary | ICD-10-CM

## 2020-07-16 NOTE — PROGRESS NOTES
His alkaline phosphatase level remains elevated though the GGT level remains low, suggesting that this is not of liver origin    I think it is best to send him to endocrinology to rule out a bone metabolism disorder causing the elevated alkaline phosphatase.

## 2020-07-16 NOTE — TELEPHONE ENCOUNTER
----- Message from Uzma Paula MD sent at 7/16/2020  3:07 PM EDT -----  His alkaline phosphatase level remains elevated though the GGT level remains low, suggesting that this is not of liver origin    I think it is best to send him to endocrinology to rule out a bone metabolism disorder causing the elevated alkaline phosphatase.

## 2020-07-17 ENCOUNTER — TELEPHONE (OUTPATIENT)
Dept: GASTROENTEROLOGY | Facility: CLINIC | Age: 50
End: 2020-07-17

## 2020-07-17 ENCOUNTER — RESULTS ENCOUNTER (OUTPATIENT)
Dept: GASTROENTEROLOGY | Facility: CLINIC | Age: 50
End: 2020-07-17

## 2020-07-17 DIAGNOSIS — R74.8 ELEVATED ALKALINE PHOSPHATASE LEVEL: ICD-10-CM

## 2020-07-17 DIAGNOSIS — K74.3 PRIMARY BILIARY CHOLANGITIS (HCC): ICD-10-CM

## 2020-07-17 DIAGNOSIS — E55.9 HYPOVITAMINOSIS D: ICD-10-CM

## 2020-07-17 NOTE — TELEPHONE ENCOUNTER
Call to pt.  Advise per Dr Paula note.  Verb understanding.     Advise that Dr Ramos's office will contact to arrange appt.

## 2020-07-17 NOTE — TELEPHONE ENCOUNTER
----- Message from Marleny Winter sent at 7/17/2020 12:50 PM EDT -----  Regarding: ursodiol (ACTIGALL) 300 MG capsule   Contact: [phone]  Pt is calling to see if he needs to continue the medication.

## 2020-07-17 NOTE — TELEPHONE ENCOUNTER
Uzma Paula MD  You 1 hour ago (1:39 PM)      I would like to get the endocrinologist input on his alkaline phosphatase level first before we have him come off of that.      **Call to pt.  Advise per DR Paula note  Verb understanding.  Payal Stock RN.

## 2020-08-13 ENCOUNTER — OFFICE VISIT (OUTPATIENT)
Dept: CARDIOLOGY | Facility: CLINIC | Age: 50
End: 2020-08-13

## 2020-08-13 VITALS
WEIGHT: 204 LBS | BODY MASS INDEX: 27.63 KG/M2 | SYSTOLIC BLOOD PRESSURE: 142 MMHG | HEIGHT: 72 IN | HEART RATE: 55 BPM | DIASTOLIC BLOOD PRESSURE: 70 MMHG

## 2020-08-13 DIAGNOSIS — R07.2 PRECORDIAL PAIN: ICD-10-CM

## 2020-08-13 DIAGNOSIS — R53.83 OTHER FATIGUE: Primary | ICD-10-CM

## 2020-08-13 PROCEDURE — 99214 OFFICE O/P EST MOD 30 MIN: CPT | Performed by: INTERNAL MEDICINE

## 2020-08-13 PROCEDURE — 93000 ELECTROCARDIOGRAM COMPLETE: CPT | Performed by: INTERNAL MEDICINE

## 2020-08-13 NOTE — PROGRESS NOTES
Baron Abdullahi  1970  Date of Office Visit: 08/13/20  Encounter Provider: Erasto Linton MD  Place of Service: [facility] CARDIOLOGY      CHIEF COMPLAINT:  Inferior ST elevation MI  Coronary artery disease  Ischemic cardiomyopathy    HISTORY OF PRESENT ILLNESS:Dr. Murguia,   I had the pleasure of seeing this patient back in followup after his hospitalization with a myocardial infarction in 12/2019. He presented via [facility] with an inferior STEMI on 12/08/2019. He underwent cardiac catheterization and had a subtotally occluded distal RCA. He underwent PCI to the distal RCA with a 3.0 x 33 mm Xience Lainey drug-eluting stent, post dilated to high pressure. He had distal cutoff at the [facility] and subsequently received intracoronary integrelin overnight. His post procedure echocardiogram revealed a low normal ejection fraction of around 45% to 50%. He had no significant valvular heart disease. He did undergo a gallbladder ultrasound and GI evaluation due to an elevated alkaline phosphatase and has followed up with GI. His circumflex was noted to have around 50% to 60% discrete stenosis in the mid segment.     Since our last visit he states he just has not felt great. He reports more fatigue than he has had previously. He states that by the end of the day he is worn out and just unable to do much of anything. He also reports that he has occasional chest discomfort and shortness of breath with exertion. This will happen it sounds like a couple of times a week and requires him to rest. He denies significant issues with rest pain. His blood pressure is just mildly elevated today at 142/70. His wife does state that although he sleeps at night that he snores and she is convinced that he has sleep apnea.         Review of Systems   Constitution: Positive for malaise/fatigue. Negative for fever.   HENT: Negative for nosebleeds and sore throat.    Eyes: Negative for blurred vision and  double vision.   Cardiovascular: Positive for chest pain and dyspnea on exertion. Negative for claudication, palpitations and syncope.   Respiratory: Negative for cough, shortness of breath and snoring.    Endocrine: Negative for cold intolerance, heat intolerance and polydipsia.   Skin: Negative for itching, poor wound healing and rash.   Musculoskeletal: Negative for joint pain, joint swelling, muscle weakness and myalgias.   Gastrointestinal: Negative for abdominal pain, melena, nausea and vomiting.   Neurological: Negative for light-headedness, loss of balance, seizures, vertigo and weakness.   Psychiatric/Behavioral: Negative for altered mental status and depression.       Past Medical History:   Diagnosis Date   • Coronary artery disease involving native coronary artery of native heart without angina pectoris 12/18/2019   • Gout        The following portions of the patient's history were reviewed and updated as appropriate: Social history , Family history and Surgical history     Current Outpatient Medications on File Prior to Visit   Medication Sig Dispense Refill   • aspirin 81 MG EC tablet Take 1 tablet by mouth Daily. 30 tablet 11   • atorvastatin (LIPITOR) 40 MG tablet Take 1 tablet by mouth Every Night. 30 tablet 11   • cholecalciferol (VITAMIN D3) 10 MCG (400 UNIT) tablet Take 2 tablets by mouth Daily. 180 tablet 1   • metoprolol succinate XL (TOPROL-XL) 25 MG 24 hr tablet Take 1 tablet by mouth Daily. 30 tablet 11   • prasugrel (EFFIENT) 10 MG tablet Take 1 tablet by mouth Daily. 30 tablet 11   • probenecid (BENEMID) 500 MG tablet Take 500 mg by mouth 2 (Two) Times a Day.     • ursodiol (ACTIGALL) 300 MG capsule TAKE 2 CAPSULES BY MOUTH 2 (TWO) TIMES A DAY WITH MEALS. 360 capsule 1     No current facility-administered medications on file prior to visit.        Allergies   Allergen Reactions   • Morphine Hives       Vitals:    08/13/20 1112   BP: 142/70   Pulse: 55   Weight: 92.5 kg (204 lb)   Height:  "182.9 cm (72\")     Physical Exam   Constitutional: He is oriented to person, place, and time. He appears well-developed and well-nourished.   HENT:   Head: Normocephalic and atraumatic.   Eyes: Conjunctivae and EOM are normal. No scleral icterus.   Neck: Normal range of motion. Neck supple. Normal carotid pulses, no hepatojugular reflux and no JVD present. Carotid bruit is not present. No tracheal deviation present. No thyromegaly present.   Cardiovascular: Normal rate and regular rhythm. Exam reveals no gallop and no friction rub.   No murmur heard.  Pulses:       Carotid pulses are 2+ on the right side, and 2+ on the left side.       Radial pulses are 2+ on the right side, and 2+ on the left side.        Femoral pulses are 2+ on the right side, and 2+ on the left side.       Dorsalis pedis pulses are 2+ on the right side, and 2+ on the left side.        Posterior tibial pulses are 2+ on the right side, and 2+ on the left side.   Pulmonary/Chest: Breath sounds normal. No respiratory distress. He has no decreased breath sounds. He has no wheezes. He has no rhonchi. He has no rales. He exhibits no tenderness.   Abdominal: Soft. Bowel sounds are normal. He exhibits no distension. There is no tenderness. There is no rebound.   Musculoskeletal: He exhibits no edema or deformity.   Neurological: He is alert and oriented to person, place, and time. He has normal strength. No sensory deficit.   Skin: No rash noted. No erythema.   Psychiatric: He has a normal mood and affect. His behavior is normal.       Lab Results   Component Value Date    WBC 5.95 05/18/2020    HGB 15.0 05/18/2020    HCT 43.8 05/18/2020    MCV 90.9 05/18/2020     05/18/2020       Lab Results   Component Value Date    GLUCOSE 94 12/09/2019    BUN 11 07/15/2020    CREATININE 1.21 07/15/2020    EGFRIFNONA 64 07/15/2020    EGFRIFAFRI 77 07/15/2020    BCR 9.1 07/15/2020    K 4.6 07/15/2020    CO2 26.7 07/15/2020    CALCIUM 9.5 07/15/2020    " PROTENTOTREF 7.4 07/15/2020    ALBUMIN 4.80 07/15/2020    LABIL2 1.8 07/15/2020    AST 23 07/15/2020    ALT 17 07/15/2020       Lab Results   Component Value Date    GLUCOSE 94 12/09/2019    CALCIUM 9.5 07/15/2020     07/15/2020    K 4.6 07/15/2020    CO2 26.7 07/15/2020     07/15/2020    BUN 11 07/15/2020    CREATININE 1.21 07/15/2020    EGFRIFAFRI 77 07/15/2020    EGFRIFNONA 64 07/15/2020    BCR 9.1 07/15/2020    ANIONGAP 12.0 12/09/2019       Lab Results   Component Value Date    CHOL 161 12/09/2019    CHLPL 136 02/18/2020    TRIG 114 02/18/2020    HDL 36 (L) 02/18/2020    LDL 77 02/18/2020         ECG 12 Lead  Date/Time: 8/13/2020 12:07 PM  Performed by: Erasto Linton MD  Authorized by: Erasto Linton MD   Comparison: compared with previous ECG from 12/19/2019  Similar to previous ECG  Rhythm: sinus rhythm  Rate: normal  QRS axis: left    Clinical impression: non-specific ECG               Results for orders placed during the hospital encounter of 12/08/19   Echocardiogram 2D complete    Narrative · Estimated EF = 45%. Quantified left ventricular ejection fraction 49%.  · Left ventricular systolic function is low normal.  · The following left ventricular wall segments are mildly hypokinetic:   basal inferolateral, mid inferolateral and basal inferior.        12/8/19  Conclusions:   1. Left main: Normal  2. LAD: 40% proximal stenosis.  3. LCX: Discrete 60% mid vessel stenosis.  4. RCA: Subtotally occluded in the distal segment with a diffuse 99% distal RCA stenosis.  30% PDA stenosis.  5.  Normal left ventricular size and systolic function  6.  Successful PCI of the distal RCA extending into the PDA with a 3.0 x 33 mm Xience Lainey drug-eluting stent, postdilated with a 3.55 noncompliant balloon in the proximal stent segment and a 3.25 noncompliant trek balloon in the mid to distal stent segment.    DISCUSSION/SUMMARYA very pleasant 50-year-old male who presented in 12/2019 with an  inferior STEMI. He underwent PCI of the distal RCA with 1 drug-eluting stent, post dilated to high pressure. He tolerated this well. He had a mildly decreased ejection fraction of around 45% to 50%, with a wall motion abnormality consistent with a myocardial infarction.  He complains of dyspnea on exertion.  He also reports occasional chest discomfort that happens a couple times a week.  His main complaint at this point in time however is fatigue.  His wife states that by the end of the day he is worn out and that he snores much of the night.    1. Coronary artery disease with history of inferior STEMI.  - Prior drug-eluting stent placement to the RCA as documented above.  - Continue aspirin lifelong  - Continue atorvastatin at current dose.   -He does have symptoms symptoms of discomfort and dyspnea and I do think that a stress test is reasonable at this point in time.  2. Hyperlipidemia.  - LDL level mildly elevated at 77. I think it is reasonable to continue the atorvastatin at only 40 mg daily.   3. Ischemic cardiomyopathy: Ejection fraction low normal to mildly depressed at 45% to 50%.    - I will continue him on Toprol therapy.   - No indication for diuretic.    4.  Fatigue/obstructive sleep apnea  -Referral to sleep team has been placed.    I will see him back in 6 months.

## 2020-08-19 ENCOUNTER — HOSPITAL ENCOUNTER (OUTPATIENT)
Dept: CARDIOLOGY | Facility: HOSPITAL | Age: 50
Discharge: HOME | End: 2020-08-19
Admitting: INTERNAL MEDICINE

## 2020-08-19 VITALS — HEIGHT: 72 IN | BODY MASS INDEX: 27.62 KG/M2 | WEIGHT: 203.93 LBS

## 2020-08-19 DIAGNOSIS — R53.83 OTHER FATIGUE: ICD-10-CM

## 2020-08-19 DIAGNOSIS — R07.2 PRECORDIAL PAIN: ICD-10-CM

## 2020-08-19 LAB
BH CV NUCLEAR PRIOR STUDY: 3
BH CV STRESS BP STAGE 1: NORMAL
BH CV STRESS BP STAGE 2: NORMAL
BH CV STRESS BP STAGE 3: NORMAL
BH CV STRESS DURATION MIN STAGE 1: 3
BH CV STRESS DURATION MIN STAGE 2: 3
BH CV STRESS DURATION MIN STAGE 3: 3
BH CV STRESS DURATION SEC STAGE 1: 0
BH CV STRESS DURATION SEC STAGE 2: 0
BH CV STRESS DURATION SEC STAGE 3: 0
BH CV STRESS DURATION SEC STAGE 4: 30
BH CV STRESS GRADE STAGE 1: 10
BH CV STRESS GRADE STAGE 2: 12
BH CV STRESS GRADE STAGE 3: 14
BH CV STRESS GRADE STAGE 4: 16
BH CV STRESS HR STAGE 1: 100
BH CV STRESS HR STAGE 2: 119
BH CV STRESS HR STAGE 3: 152
BH CV STRESS HR STAGE 4: 162
BH CV STRESS METS STAGE 1: 5
BH CV STRESS METS STAGE 2: 7.5
BH CV STRESS METS STAGE 3: 10
BH CV STRESS METS STAGE 4: 13.5
BH CV STRESS PROTOCOL 1: NORMAL
BH CV STRESS RECOVERY BP: NORMAL MMHG
BH CV STRESS RECOVERY HR: 85 BPM
BH CV STRESS SPEED STAGE 1: 1.7
BH CV STRESS SPEED STAGE 2: 2.5
BH CV STRESS SPEED STAGE 3: 3.4
BH CV STRESS SPEED STAGE 4: 4.2
BH CV STRESS STAGE 1: 1
BH CV STRESS STAGE 2: 2
BH CV STRESS STAGE 3: 3
BH CV STRESS STAGE 4: 4
LV EF NUC BP: 51 %
MAXIMAL PREDICTED HEART RATE: 170 BPM
PERCENT MAX PREDICTED HR: 95.29 %
STRESS BASELINE BP: NORMAL MMHG
STRESS BASELINE HR: 69 BPM
STRESS PERCENT HR: 112 %
STRESS POST ESTIMATED WORKLOAD: 11 METS
STRESS POST EXERCISE DUR MIN: 9 MIN
STRESS POST EXERCISE DUR SEC: 30 SEC
STRESS POST PEAK BP: NORMAL MMHG
STRESS POST PEAK HR: 162 BPM
STRESS TARGET HR: 145 BPM

## 2020-08-19 PROCEDURE — 93017 CV STRESS TEST TRACING ONLY: CPT

## 2020-08-19 PROCEDURE — 93018 CV STRESS TEST I&R ONLY: CPT | Performed by: INTERNAL MEDICINE

## 2020-08-19 PROCEDURE — 78452 HT MUSCLE IMAGE SPECT MULT: CPT | Performed by: INTERNAL MEDICINE

## 2020-08-19 PROCEDURE — A9502 TC99M TETROFOSMIN: HCPCS | Performed by: INTERNAL MEDICINE

## 2020-08-19 PROCEDURE — 0 TECHNETIUM TETROFOSMIN KIT: Performed by: INTERNAL MEDICINE

## 2020-08-19 PROCEDURE — 93016 CV STRESS TEST SUPVJ ONLY: CPT | Performed by: INTERNAL MEDICINE

## 2020-08-19 PROCEDURE — 78452 HT MUSCLE IMAGE SPECT MULT: CPT

## 2020-08-19 RX ADMIN — TETROFOSMIN 1 DOSE: 1.38 INJECTION, POWDER, LYOPHILIZED, FOR SOLUTION INTRAVENOUS at 10:30

## 2020-08-19 RX ADMIN — TETROFOSMIN 1 DOSE: 1.38 INJECTION, POWDER, LYOPHILIZED, FOR SOLUTION INTRAVENOUS at 09:15

## 2020-08-21 ENCOUNTER — TELEPHONE (OUTPATIENT)
Dept: CARDIOLOGY | Facility: CLINIC | Age: 50
End: 2020-08-21

## 2020-09-02 ENCOUNTER — APPOINTMENT (OUTPATIENT)
Dept: SLEEP MEDICINE | Facility: HOSPITAL | Age: 50
End: 2020-09-02

## 2020-09-03 NOTE — PROGRESS NOTES
Ricardo Abdullahi is a 50 y.o. male. Pt left without being seen               Review of Systems  Objective   Physical Exam  Hospital Outpatient Visit on 08/19/2020   Component Date Value Ref Range Status   • Nuclear Prior Study 08/19/2020 3   Final   • BH CV STRESS PROTOCOL 1 08/19/2020 [place]   Final   • Stage 1 08/19/2020 1   Final   • HR Stage 1 08/19/2020 100   Final   • BP Stage 1 08/19/2020 152/84   Final   • Duration Min Stage 1 08/19/2020 3   Final   • Duration Sec Stage 1 08/19/2020 0   Final   • Grade Stage 1 08/19/2020 10   Final   • Speed Stage 1 08/19/2020 1.7   Final   • BH CV STRESS METS STAGE 1 08/19/2020 5   Final   • Stage 2 08/19/2020 2   Final   • HR Stage 2 08/19/2020 119   Final   • BP Stage 2 08/19/2020 160/90   Final   • Duration Min Stage 2 08/19/2020 3   Final   • Duration Sec Stage 2 08/19/2020 0   Final   • Grade Stage 2 08/19/2020 12   Final   • Speed Stage 2 08/19/2020 2.5   Final   • BH CV STRESS METS STAGE 2 08/19/2020 7.5   Final   • Stage 3 08/19/2020 3   Final   • HR Stage 3 08/19/2020 152   Final   • BP Stage 3 08/19/2020 158/84   Final   • Duration Min Stage 3 08/19/2020 3   Final   • Duration Sec Stage 3 08/19/2020 0   Final   • Grade Stage 3 08/19/2020 14   Final   • Speed Stage 3 08/19/2020 3.4   Final   • BH CV STRESS METS STAGE 3 08/19/2020 10.0   Final   • Stage 4 08/19/2020 4   Final   • HR Stage 4 08/19/2020 162   Final   • Duration Sec Stage 4 08/19/2020 30   Final   • Grade Stage 4 08/19/2020 16   Final   • Speed Stage 4 08/19/2020 4.2   Final   • BH CV STRESS METS STAGE 4 08/19/2020 13.5   Final   • Baseline HR 08/19/2020 69  bpm Final   • Baseline BP 08/19/2020 130/80  mmHg Final   • Peak HR 08/19/2020 162  bpm Final   • Percent Max Pred HR 08/19/2020 95.29  % Final   • Percent Target HR 08/19/2020 112  % Final   • Peak BP 08/19/2020 158/84  mmHg Final   • Recovery HR 08/19/2020 85  bpm Final   • Recovery BP 08/19/2020 158/80  mmHg Final   • Target HR (85%)  08/19/2020 145  bpm Final   • Max. Pred. HR (100%) 08/19/2020 170  bpm Final   • Exercise duration (min) 08/19/2020 9  min Final   • Exercise duration (sec) 08/19/2020 30  sec Final   • Estimated workload 08/19/2020 11.0  METS Final   • Nuc Stress EF 08/19/2020 51  % Final     Assessment/Plan

## 2020-09-14 ENCOUNTER — OFFICE VISIT (OUTPATIENT)
Dept: SLEEP MEDICINE | Facility: HOSPITAL | Age: 50
End: 2020-09-14

## 2020-09-14 ENCOUNTER — OFFICE VISIT (OUTPATIENT)
Dept: ENDOCRINOLOGY | Age: 50
End: 2020-09-14

## 2020-09-14 VITALS
HEART RATE: 62 BPM | DIASTOLIC BLOOD PRESSURE: 87 MMHG | SYSTOLIC BLOOD PRESSURE: 131 MMHG | HEIGHT: 73 IN | WEIGHT: 204 LBS | OXYGEN SATURATION: 98 % | BODY MASS INDEX: 27.04 KG/M2

## 2020-09-14 DIAGNOSIS — G47.10 HYPERSOMNIA: Primary | ICD-10-CM

## 2020-09-14 DIAGNOSIS — E78.5 HYPERLIPIDEMIA, UNSPECIFIED HYPERLIPIDEMIA TYPE: ICD-10-CM

## 2020-09-14 DIAGNOSIS — R06.83 SNORING: ICD-10-CM

## 2020-09-14 DIAGNOSIS — I25.2 HISTORY OF MI (MYOCARDIAL INFARCTION): ICD-10-CM

## 2020-09-14 DIAGNOSIS — E55.9 HYPOVITAMINOSIS D: Primary | ICD-10-CM

## 2020-09-14 DIAGNOSIS — R06.81 WITNESSED EPISODE OF APNEA: ICD-10-CM

## 2020-09-14 PROBLEM — Z53.21 PATIENT LEFT WITHOUT BEING SEEN: Status: ACTIVE | Noted: 2020-09-14

## 2020-09-14 PROCEDURE — G0463 HOSPITAL OUTPT CLINIC VISIT: HCPCS

## 2020-09-14 NOTE — PROGRESS NOTES
[facility]  Telephone: [phone] / Fax: [phone] [place]  Telephone: [phone] / Fax: [phone] [place]    Referring Physician: Dr. Linton  PCP: Storm Murguia MD    Reason for consult:  sleep apnea    Baron Abdullahi is a 50 y.o.male  was seen in the Sleep Disorders Center today for evaluation of sleep apnea. He had a heart attack in December 2019. He had 1 stent placed. He was referred by Dr Linton for evaluation of PHONG. Wife reports loud snoring on some nights. He snores worse on his back.  He wakes up feeling tired. Wife reports witnessed apneas. His sleep schedule is 9:30-6:30am.  He lives 1 hour away and prefers to do the study in the hospital.    SH- former smokers age [age], drinks 6 pack of beer per week- though less since heart attack.    ROS- +myalgias, rest is negative.    Baron Abdullahi  has a past medical history of Coronary artery disease involving native coronary artery of native heart without angina pectoris (12/18/2019) and Gout.    Current Medications:    Current Outpatient Medications:   •  aspirin 81 MG EC tablet, Take 1 tablet by mouth Daily., Disp: 30 tablet, Rfl: 11  •  atorvastatin (LIPITOR) 40 MG tablet, Take 1 tablet by mouth Every Night., Disp: 30 tablet, Rfl: 11  •  cholecalciferol (VITAMIN D3) 10 MCG (400 UNIT) tablet, Take 2 tablets by mouth Daily., Disp: 180 tablet, Rfl: 1  •  metoprolol succinate XL (TOPROL-XL) 25 MG 24 hr tablet, Take 1 tablet by mouth Daily., Disp: 30 tablet, Rfl: 11  •  prasugrel (EFFIENT) 10 MG tablet, Take 1 tablet by mouth Daily., Disp: 30 tablet, Rfl: 11  •  probenecid (BENEMID) 500 MG tablet, Take 500 mg by mouth 2 (Two) Times a Day., Disp: , Rfl:   •  ursodiol (ACTIGALL) 300 MG capsule, TAKE 2 CAPSULES BY MOUTH 2 (TWO) TIMES A DAY WITH MEALS., Disp: 360 capsule, Rfl: 1    I have reviewed Past Medical History, Past Surgical History, Medication List, Social History and Family History as entered in Sleep Questionnaire  "and EPIC.    ESS  13   Vital Signs /87   Pulse 62   Ht 185.4 cm (73\")   Wt 92.5 kg (204 lb)   SpO2 98%   BMI 26.91 kg/m²  Body mass index is 26.91 kg/m².    General Alert and oriented. No acute distress noted   Pharynx/Throat Class II Mallampati airway, large tongue, no evidence of redundant lateral pharyngeal tissue. No oral lesions. No thrush. Moist mucous membranes.   Head Normocephalic. Symmetrical. Atraumatic.    Nose No septal deviation. No drainage   Chest Wall Normal shape. Symmetric expansion with respiration. No tenderness.   Neck Trachea midline, no thyromegaly or adenopathy    Lungs Clear to auscultation bilaterally. No wheezes. No rhonchi. No rales. Respirations regular, even and unlabored.   Heart Regular rhythm and normal rate. Normal S1 and S2. No murmur   Abdomen Soft, non-tender and non-distended. Normal bowel sounds. No masses.   Extremities Moves all extremities well. No edema   Psychiatric Normal mood and affect.        Impression:  1. Hypersomnia    2. Snoring    3. Witnessed episode of apnea    4. History of MI (myocardial infarction)          Plan:  If in lab PSG is not approved by insurance, patient will be notified and study will be changed to HST.    I discussed the pathophysiology of obstructive sleep apnea with the patient.  We discussed the adverse outcomes associated with untreated sleep-disordered breathing.  We discussed treatment modalities of obstructive sleep apnea including CPAP device. Sleep study will be scheduled to establish a definitive diagnosis of sleep disorder breathing.  Weight loss will be strongly beneficial in order to reduce the severity of sleep-disordered breathing.  Patient has narrow oropharyngeal structure.  Caution during activities that require prolonged concentration is strongly advised.  After sleep study results are available, patient will be notified, and appointment will be scheduled to discuss sleep study results and treatment " recommendations.    Rx for Ambien 5mg x 1 was provided for in lab polysomnogram. Patient was instructed to bring the Ambien tablet to the sleep lab and take at lights out . Strict instructions were given to NOT take the Ambien at home.    Instructions for the night sleep tech  It is permitted to use zolpidem 5 mg prior to 1 AM.  If patient has Obstructive Sleep Apnea on diagnostic portion with AHI > 5 please do titration with CPAP and/or BIPAP per sleep disorder center policy. If patient has Central Sleep Apnea on diagnostic portion with index > 5, do titration with Adaptive Servo Titration device. If patient requires addition of supplemental oxygen therapy during PAP titration study, inform MD in AM.     I appreciate the opportunity to participate in this patient's care.      MITRA Taylor  [facility]  Phone: [phone]      Part of this note may be an electronic transcription/translation of spoken language to printed text using the Dragon Dictation System. Some errors may exist even though the document was edited.

## 2020-09-15 ENCOUNTER — TELEPHONE (OUTPATIENT)
Dept: GASTROENTEROLOGY | Facility: CLINIC | Age: 50
End: 2020-09-15

## 2020-09-15 DIAGNOSIS — K74.3 PRIMARY BILIARY CHOLANGITIS (HCC): ICD-10-CM

## 2020-09-15 DIAGNOSIS — R74.8 ELEVATED ALKALINE PHOSPHATASE LEVEL: Primary | ICD-10-CM

## 2020-09-15 NOTE — TELEPHONE ENCOUNTER
----- Message from Baron Abdullahi sent at 9/15/2020  2:50 PM EDT -----  Regarding: Visit Follow-Up Question  Contact: [phone]  I have an appointment on 9/22 at 2 pm and wanted to ask if it was needed. I must drive 2.5 hours round trip and miss work. Can I stop taking the meds to try and help my Alkaline Phosphatase numbers?  I am trying to reschedule a visit with Dr. Ramos in October.    Thanks,

## 2020-09-17 NOTE — TELEPHONE ENCOUNTER
It is okay for him to stop the medication.  He does not have to have the office visit on September 22 but I would like some lab test within the next few weeks.    He may need to investigate gastroenterology of [facility] given travel time to get here.  [place]    I have entered labs for him to have drawn at his convenienc in the next few weeks

## 2020-09-17 NOTE — TELEPHONE ENCOUNTER
Called pt and advised or Dr Paula note. Pt verb understanding and appt cancelled. Pt is going to try and get labs done at labs done at a local lab [facility] and is to call back if he can not get them done there.

## 2020-09-28 ENCOUNTER — RESULTS ENCOUNTER (OUTPATIENT)
Dept: GASTROENTEROLOGY | Facility: CLINIC | Age: 50
End: 2020-09-28

## 2020-09-28 DIAGNOSIS — R74.8 ELEVATED ALKALINE PHOSPHATASE LEVEL: ICD-10-CM

## 2020-09-28 DIAGNOSIS — K74.3 PRIMARY BILIARY CHOLANGITIS (HCC): ICD-10-CM

## 2020-10-26 ENCOUNTER — APPOINTMENT (OUTPATIENT)
Dept: SLEEP MEDICINE | Facility: HOSPITAL | Age: 50
End: 2020-10-26

## 2020-12-03 ENCOUNTER — TELEPHONE (OUTPATIENT)
Dept: CARDIOLOGY | Facility: CLINIC | Age: 50
End: 2020-12-03

## 2020-12-03 RX ORDER — METOPROLOL SUCCINATE 25 MG/1
25 TABLET, EXTENDED RELEASE ORAL DAILY
Qty: 90 TABLET | Refills: 3 | Status: SHIPPED | OUTPATIENT
Start: 2020-12-03 | End: 2021-12-07

## 2020-12-03 RX ORDER — ASPIRIN 81 MG/1
81 TABLET ORAL DAILY
Qty: 90 TABLET | Refills: 3 | Status: SHIPPED | OUTPATIENT
Start: 2020-12-03 | End: 2021-12-06

## 2020-12-03 RX ORDER — ATORVASTATIN CALCIUM 40 MG/1
40 TABLET, FILM COATED ORAL NIGHTLY
Qty: 90 TABLET | Refills: 3 | Status: SHIPPED | OUTPATIENT
Start: 2020-12-03 | End: 2021-12-07

## 2020-12-03 NOTE — TELEPHONE ENCOUNTER
----- Message from Jessica Whiteside MA sent at 12/3/2020  1:55 PM EST -----  Regarding: FW: Prescription Question  Contact: [phone]    ----- Message -----  From: Baron Abdullahi  Sent: 12/3/2020   7:44 AM EST  To: Cherri Farley [facility]  Subject: Prescription Question                            All of the following meds have no renewals left and I have 3 days worth of pills. Should they be renewed or should I just stop taking them? On December 8th will be one year since my heart attack, should I be having a check up to see if I need these meds?  Please advise ASAP  Aspirin 81 mg  Atorvastatin 40 mg  Prasugrel 10 mg  Metoprolol Succinate XL 25 mg

## 2020-12-03 NOTE — TELEPHONE ENCOUNTER
I spoke with him.  I will refill his aspirin, atorvastatin, and metoprolol.  I do not think he needs to continue the prasugrel as it has been 1 year since his stent placement.  He does need to follow-up in the office, and we will get him scheduled.      Please schedule a follow-up appointment with me sometime in the next few weeks

## 2020-12-18 ENCOUNTER — TELEMEDICINE (OUTPATIENT)
Dept: CARDIOLOGY | Facility: CLINIC | Age: 50
End: 2020-12-18

## 2020-12-18 DIAGNOSIS — I25.5 ISCHEMIC CARDIOMYOPATHY: ICD-10-CM

## 2020-12-18 DIAGNOSIS — I25.10 CORONARY ARTERY DISEASE INVOLVING NATIVE CORONARY ARTERY OF NATIVE HEART WITHOUT ANGINA PECTORIS: Primary | ICD-10-CM

## 2020-12-18 PROCEDURE — 99214 OFFICE O/P EST MOD 30 MIN: CPT | Performed by: NURSE PRACTITIONER

## 2020-12-18 NOTE — PROGRESS NOTES
Date of Office Visit: 2020  Encounter Provider: MITRA Ambrosio  Place of Service: [facility] CARDIOLOGY  Patient Name: Baron Abdullahi  :1970    Chief Complaint   Patient presents with   • Coronary Artery Disease     Follow-up   :     HPI: Baron Abdullahi is a 50 y.o. male, known to me, who presents today for follow-up.  Old records have been obtained and reviewed by me.  He is a patient of Dr. Linton's with a past cardiac history significant for coronary artery disease.  In 2019, he ruled in for an inferior STEMI due to a subtotal occlusion of the distal RCA.  He underwent PCI of the distal RCA extending into the PDA with a drug-eluting stent.  At that time, he was also noted to have a 60% mid left circumflex lesion.  Follow-up echocardiogram revealed a low normal EF of around 45 to 50%.   He was last seen in the office by Dr. Linton on 2020 at which time he really was not feeling that well.  He was reporting increased fatigue and occasional chest discomfort along with shortness of breath on exertion.  Dr. Linton ordered a nuclear stress test which revealed no evidence of ischemia.  He was advised to follow-up in 6 months.  He has agreed to a telehealth visit for follow-up.   Overall he has been doing well.  He denies any chest pain, shortness of breath, palpitations, edema, dizziness, or syncope.  Evidently the reason he felt so poorly a couple months back was related to a medication he was taking for his liver.  Ultimately, this medication was stopped and felt better almost immediately and has been normal ever since.  He works for a company called [employer] in [place].  He is not exercising regularly outside of work.  He also never checks his blood pressure or really ever goes to the doctor.    Past Medical History:   Diagnosis Date   • Coronary artery disease involving native coronary artery of native heart without angina pectoris 2019   •  Gout        Past Surgical History:   Procedure Laterality Date   • CARDIAC CATHETERIZATION N/A 12/8/2019    Procedure: Left Heart Cath;  Surgeon: Erasto Linton MD;  Location:  [facility] CATH INVASIVE LOCATION;  Service: Cardiovascular   • CARDIAC CATHETERIZATION N/A 12/8/2019    Procedure: Coronary angiography;  Surgeon: Erasto Linton MD;  Location:  [facility] CATH INVASIVE LOCATION;  Service: Cardiovascular   • CARDIAC CATHETERIZATION N/A 12/8/2019    Procedure: Stent ARIEL coronary;  Surgeon: Erasto Linton MD;  Location:  [facility] CATH INVASIVE LOCATION;  Service: Cardiovascular   • CARDIAC CATHETERIZATION N/A 12/8/2019    Procedure: Left ventriculography;  Surgeon: Erasto Linton MD;  Location:  [facility] CATH INVASIVE LOCATION;  Service: Cardiovascular   • CORONARY ANGIOPLASTY  12/8/19   • CORONARY STENT PLACEMENT  12/8/19   • JOINT REPLACEMENT      Left       Social History     Socioeconomic History   • Marital status:      Spouse name: Not on file   • Number of children: Not on file   • Years of education: Not on file   • Highest education level: Not on file   Tobacco Use   • Smoking status: Former Smoker     Packs/day: 0.50     Years: 10.00     Pack years: 5.00     Types: Cigarettes     Start date: 7/1/1988     Quit date: 4/15/2010     Years since quitting: 10.6   • Smokeless tobacco: Never Used   • Tobacco comment: on and off smoker and chew until quit date   Substance and Sexual Activity   • Alcohol use: Yes     Alcohol/week: 35.0 standard drinks     Types: 15 Cans of beer, 20 Standard drinks or equivalent per week     Comment: caffeine use    • Drug use: Never   • Sexual activity: Yes     Partners: Female     Birth control/protection: None       Family History   Problem Relation Age of Onset   • Heart disease Father         Dbl bypass   • Heart failure Father    • Heart attack Maternal Grandfather         at age 46   • Heart failure Maternal Grandfather    • Heart disease Maternal  Grandfather    • No Known Problems Mother        Review of Systems   Constitution: Negative for chills, fever and malaise/fatigue.   Cardiovascular: Negative for chest pain, dyspnea on exertion, leg swelling, near-syncope, orthopnea, palpitations, paroxysmal nocturnal dyspnea and syncope.   Respiratory: Negative for cough and shortness of breath.    Musculoskeletal: Negative for joint pain, joint swelling and myalgias.   Gastrointestinal: Negative for abdominal pain, diarrhea, melena, nausea and vomiting.   Genitourinary: Negative for frequency and hematuria.   Neurological: Negative for light-headedness, numbness, paresthesias and seizures.   Allergic/Immunologic: Negative.    All other systems reviewed and are negative.      Allergies   Allergen Reactions   • Morphine Hives         Current Outpatient Medications:   •  aspirin (aspirin) 81 MG EC tablet, Take 1 tablet by mouth Daily., Disp: 90 tablet, Rfl: 3  •  atorvastatin (LIPITOR) 40 MG tablet, Take 1 tablet by mouth Every Night., Disp: 90 tablet, Rfl: 3  •  cholecalciferol (VITAMIN D3) 10 MCG (400 UNIT) tablet, Take 2 tablets by mouth Daily., Disp: 180 tablet, Rfl: 1  •  metoprolol succinate XL (TOPROL-XL) 25 MG 24 hr tablet, Take 1 tablet by mouth Daily., Disp: 90 tablet, Rfl: 3  •  probenecid (BENEMID) 500 MG tablet, Take 500 mg by mouth 2 (Two) Times a Day., Disp: , Rfl:   •  ursodiol (ACTIGALL) 300 MG capsule, TAKE 2 CAPSULES BY MOUTH 2 (TWO) TIMES A DAY WITH MEALS., Disp: 360 capsule, Rfl: 1      Objective:     Vitals:     There is no height or weight on file to calculate BMI.    PHYSICAL EXAM:    Constitutional:       Appearance: Normal appearance.   HENT:      Head: Normocephalic and atraumatic.   Neck:      Musculoskeletal: Normal range of motion.   Pulmonary:      Effort: Pulmonary effort is normal.   Neurological:      Mental Status: Alert and oriented to person, place, and time.   Psychiatric:         Attention and Perception: Attention normal.          Mood and Affect: Mood normal.         Speech: Speech normal.         Behavior: Behavior is cooperative.         Assessment:       Diagnosis Plan   1. Coronary artery disease involving native coronary artery of native heart without angina pectoris     2. Ischemic cardiomyopathy  Adult Transthoracic Echo Complete W/ Cont if Necessary Per Protocol     Orders Placed This Encounter   Procedures   • Adult Transthoracic Echo Complete W/ Cont if Necessary Per Protocol     Standing Status:   Future     Standing Expiration Date:   12/18/2021     Scheduling Instructions:      Please schedule on day of next appointment with Dr. Linton in 6 months     Order Specific Question:   Reason for exam?     Answer:   Heart Failure, Cardiomyopathy, or Sytemic or Pulmonary Hypertension     Order Specific Question:   Hypertension, Heart Failure, or Cardiomyopathy specification?     Answer:   Known Cardiomyopathy          Plan:         1.  Coronary artery disease.  History of inferior STEMI in 2019 and subsequent stenting of the RCA.  At that time, he was also noted to have a 60% circumflex lesion.  He denies any symptoms of angina and is on good medical therapy.  His last lipid panel was in February of this year revealing an LDL of 77 and HDL of 36.      2.  Ischemic cardiomyopathy.  Echocardiogram following his MI revealed an EF of 45 to 50%.  He is on Toprol therapy.  He denies any symptoms of heart failure.  I do think we should repeat an echocardiogram for reassessment of his LV function.  I think it would be appropriate to have this repeated when he follows up with Dr. Linton in 6 months.      I think he stable and doing well.  He denies any symptoms of angina or heart failure.  I did encourage him to buy a blood pressure cuff as I would like to ensure his blood pressure is well controlled.  We discussed a blood pressure goal of less than 130/80.  He will call me if there are any issues.  Otherwise, I am not going to make any  changes.  He will follow-up with Dr. Linton in 6 months or sooner if needed.      This patient has consented to a telehealth visit via video. The visit was scheduled as a video visit to comply with patient safety concerns in accordance with CDC recommendations.  All vitals recorded within this visit are reported by the patient.  I spent 15 minutes in total including but not limited to the 8 minutes spent in direct conversation with this patient.         As always, it has been a pleasure to participate in your patient's care.      Sincerely,         MITRA Casillas   no

## 2020-12-21 ENCOUNTER — TELEPHONE (OUTPATIENT)
Dept: CARDIOLOGY | Facility: CLINIC | Age: 50
End: 2020-12-21

## 2020-12-21 NOTE — TELEPHONE ENCOUNTER
I did not order any labs that day.  I think he was supposed to be following up with his PCP for routine physicals soon, so what ever labs his PCP orders.

## 2020-12-21 NOTE — TELEPHONE ENCOUNTER
Pt was just a little confused as to the discussion you both had over Telehealth. Advised pt to just have PCP fax us any lab results they do during his next physical. Pt verbalized understanding.    TRACI Calzada

## 2020-12-21 NOTE — TELEPHONE ENCOUNTER
Pt L/M re: wanting lab orders faxed to his PCP office. I didn't see any orders from you placed. I only see an order for an ECHO. Does he need labs? And if so, Which ones does he need to have??  Please advise of recommendations.    TRACI Calzada

## 2021-02-02 RX ORDER — CHOLECALCIFEROL (VITAMIN D3) 10 MCG
CAPSULE ORAL
Qty: 180 EACH | Refills: 1 | Status: SHIPPED | OUTPATIENT
Start: 2021-02-02

## 2021-10-25 ENCOUNTER — HOSPITAL ENCOUNTER (OUTPATIENT)
Dept: CARDIOLOGY | Facility: HOSPITAL | Age: 51
Discharge: HOME | End: 2021-10-25
Admitting: NURSE PRACTITIONER

## 2021-10-25 ENCOUNTER — OFFICE VISIT (OUTPATIENT)
Dept: CARDIOLOGY | Facility: CLINIC | Age: 51
End: 2021-10-25

## 2021-10-25 VITALS
HEART RATE: 56 BPM | DIASTOLIC BLOOD PRESSURE: 80 MMHG | HEIGHT: 73 IN | SYSTOLIC BLOOD PRESSURE: 110 MMHG | WEIGHT: 217 LBS | BODY MASS INDEX: 28.76 KG/M2

## 2021-10-25 VITALS
DIASTOLIC BLOOD PRESSURE: 80 MMHG | HEIGHT: 73 IN | HEART RATE: 58 BPM | WEIGHT: 210 LBS | OXYGEN SATURATION: 97 % | SYSTOLIC BLOOD PRESSURE: 110 MMHG | BODY MASS INDEX: 27.83 KG/M2

## 2021-10-25 DIAGNOSIS — I25.10 CORONARY ARTERY DISEASE INVOLVING NATIVE CORONARY ARTERY OF NATIVE HEART WITHOUT ANGINA PECTORIS: Primary | ICD-10-CM

## 2021-10-25 DIAGNOSIS — I25.5 ISCHEMIC CARDIOMYOPATHY: ICD-10-CM

## 2021-10-25 DIAGNOSIS — R53.83 OTHER FATIGUE: ICD-10-CM

## 2021-10-25 PROCEDURE — 93000 ELECTROCARDIOGRAM COMPLETE: CPT | Performed by: INTERNAL MEDICINE

## 2021-10-25 PROCEDURE — 93306 TTE W/DOPPLER COMPLETE: CPT | Performed by: INTERNAL MEDICINE

## 2021-10-25 PROCEDURE — 93306 TTE W/DOPPLER COMPLETE: CPT

## 2021-10-25 PROCEDURE — 99214 OFFICE O/P EST MOD 30 MIN: CPT | Performed by: INTERNAL MEDICINE

## 2021-10-25 PROCEDURE — 25010000002 PERFLUTREN (DEFINITY) 8.476 MG IN SODIUM CHLORIDE (PF) 0.9 % 10 ML INJECTION: Performed by: NURSE PRACTITIONER

## 2021-10-25 RX ORDER — ESCITALOPRAM OXALATE 20 MG/1
20 TABLET ORAL DAILY
COMMUNITY
Start: 2021-10-08

## 2021-10-25 RX ADMIN — PERFLUTREN 1.5 ML: 6.52 INJECTION, SUSPENSION INTRAVENOUS at 10:35

## 2021-10-25 NOTE — PROGRESS NOTES
Date of Office Visit:10/25/21  Encounter Provider: Erasto Linton MD  Place of Service: [facility] CARDIOLOGY  Patient Name: Baron Abdullahi  :1970    Chief Complaint   Patient presents with   • Follow-up     6 month   • Coronary Artery Disease   • Ischemic cardiomyopathy   :     HPI:  51 y.o. male, known to me, who presents today for follow-up.  Old records have been obtained and reviewed by me.  He is a patient of mine with a past cardiac history significant for coronary artery disease.  In 2019, he ruled in for an inferior STEMI due to a subtotal occlusion of the distal RCA.  He underwent PCI of the distal RCA extending into the PDA with a drug-eluting stent.  At that time, he was also noted to have a 60% mid left circumflex lesion.  Follow-up echocardiogram revealed a low normal EF of around 45 to 50%.  Since that time he had a myocardial perfusion scan with no evidence of ischemia.  He is denied any recent issues with chest pain but per his wife's report does seem to have dyspnea on exertion with moderate levels of exertion.  No orthopnea or PND.  Blood pressure and heart rate are well controlled.      Past Medical History:   Diagnosis Date   • Coronary artery disease involving native coronary artery of native heart without angina pectoris 2019   • Gout        Past Surgical History:   Procedure Laterality Date   • CARDIAC CATHETERIZATION N/A 2019    Procedure: Left Heart Cath;  Surgeon: Erasto Linton MD;  Location: [facility] CATH INVASIVE LOCATION;  Service: Cardiovascular   • CARDIAC CATHETERIZATION N/A 2019    Procedure: Coronary angiography;  Surgeon: Erasto Linton MD;  Location: [facility] CATH INVASIVE LOCATION;  Service: Cardiovascular   • CARDIAC CATHETERIZATION N/A 2019    Procedure: Stent ARIEL coronary;  Surgeon: Erasto Linton MD;  Location: [facility] CATH INVASIVE LOCATION;  Service: Cardiovascular   • CARDIAC  CATHETERIZATION N/A 2019    Procedure: Left ventriculography;  Surgeon: Erasto Linton MD;  Location: [facility] CATH INVASIVE LOCATION;  Service: Cardiovascular   • CORONARY ANGIOPLASTY  19   • CORONARY STENT PLACEMENT  19   • JOINT REPLACEMENT      Left       Social History     Socioeconomic History   • Marital status:    Tobacco Use   • Smoking status: Former Smoker     Packs/day: 0.50     Years: 10.00     Pack years: 5.00     Types: Cigarettes     Start date: 1988     Quit date: 4/15/2010     Years since quittin.5   • Smokeless tobacco: Never Used   • Tobacco comment: on and off smoker and chew until quit date   Substance and Sexual Activity   • Alcohol use: Yes     Alcohol/week: 35.0 standard drinks     Types: 15 Cans of beer, 20 Standard drinks or equivalent per week     Comment: caffeine use    • Drug use: Never   • Sexual activity: Yes     Partners: Female     Birth control/protection: None       Family History   Problem Relation Age of Onset   • Heart disease Father         Dbl bypass   • Heart failure Father    • Heart attack Maternal Grandfather         at age 46   • Heart failure Maternal Grandfather    • Heart disease Maternal Grandfather    • No Known Problems Mother        Review of Systems   Constitutional: Negative for chills, fever and malaise/fatigue.   HENT: Negative for nosebleeds and sore throat.    Eyes: Negative for blurred vision and double vision.   Cardiovascular: Negative for chest pain, claudication, dyspnea on exertion, leg swelling, near-syncope, orthopnea, palpitations, paroxysmal nocturnal dyspnea and syncope.   Respiratory: Negative for cough, shortness of breath and snoring.    Endocrine: Negative for cold intolerance, heat intolerance and polydipsia.   Skin: Negative for itching, poor wound healing and rash.   Musculoskeletal: Negative for joint pain, joint swelling, muscle weakness and myalgias.   Gastrointestinal: Negative for abdominal pain,  "diarrhea, melena, nausea and vomiting.   Genitourinary: Negative for frequency and hematuria.   Neurological: Negative for light-headedness, loss of balance, numbness, paresthesias, seizures, vertigo and weakness.   Psychiatric/Behavioral: Negative for altered mental status and depression.   Allergic/Immunologic: Negative.    All other systems reviewed and are negative.      Allergies   Allergen Reactions   • Morphine Hives         Current Outpatient Medications:   •  aspirin (aspirin) 81 MG EC tablet, Take 1 tablet by mouth Daily., Disp: 90 tablet, Rfl: 3  •  atorvastatin (LIPITOR) 40 MG tablet, Take 1 tablet by mouth Every Night., Disp: 90 tablet, Rfl: 3  •  [employer] D3 10 MCG (400 UNIT) capsule, TAKE 2 TABLETS BY MOUTH EVERY DAY, Disp: 180 each, Rfl: 1  •  escitalopram (LEXAPRO) 20 MG tablet, Take 20 mg by mouth Daily., Disp: , Rfl:   •  metoprolol succinate XL (TOPROL-XL) 25 MG 24 hr tablet, Take 1 tablet by mouth Daily., Disp: 90 tablet, Rfl: 3  •  probenecid (BENEMID) 500 MG tablet, Take 500 mg by mouth 2 (Two) Times a Day., Disp: , Rfl:   No current facility-administered medications for this visit.      Objective:     Vitals:    10/25/21 1009   BP: 110/80   Pulse: 56   Weight: 98.4 kg (217 lb)   Height: 185.4 cm (73\")     Body mass index is 28.63 kg/m².    PHYSICAL EXAM:    Constitutional:       Appearance: Normal appearance. Well-developed.   Eyes:      General: No scleral icterus.     Conjunctiva/sclera: Conjunctivae normal.   HENT:      Head: Normocephalic and atraumatic.   Neck:      Thyroid: No thyromegaly.      Vascular: Normal carotid pulses. No carotid bruit, hepatojugular reflux or JVD.      Trachea: No tracheal deviation.   Pulmonary:      Effort: Pulmonary effort is normal. No respiratory distress.      Breath sounds: Normal breath sounds. No decreased breath sounds. No wheezing. No rhonchi. No rales.   Chest:      Chest wall: Not tender to palpatation.   Cardiovascular:      Normal rate. Regular " rhythm.      No gallop.   Pulses:     Carotid: 2+ bilaterally.     Radial: 2+ bilaterally.     Femoral: 2+ bilaterally.     Dorsalis pedis: 2+ bilaterally.     Posterior tibial: 2+ bilaterally.  Abdominal:      General: Bowel sounds are normal. There is no distension.      Palpations: Abdomen is soft.      Tenderness: There is no abdominal tenderness.   Musculoskeletal:         General: No deformity.      Cervical back: Normal range of motion and neck supple. Skin:     Findings: No erythema or rash.   Neurological:      Mental Status: Alert and oriented to person, place, and time.      Sensory: No sensory deficit.   Psychiatric:         Attention and Perception: Attention normal.         Mood and Affect: Mood normal.         Speech: Speech normal.         Behavior: Behavior normal. Behavior is cooperative.             ECG 12 Lead    Date/Time: 10/25/2021 11:03 AM  Performed by: Erasto Linton MD  Authorized by: Erasto Linton MD   Comparison: compared with previous ECG from 8/13/2020  Similar to previous ECG  Rhythm: sinus rhythm  Rate: normal  QRS axis: left    Clinical impression: non-specific ECG            8/19/2020  · Myocardial perfusion imaging indicates a normal myocardial perfusion study with no evidence of ischemia.  · Left ventricular ejection fraction is normal (Calculated EF = 51%).  · Impressions are consistent with a low risk study.        12/9/19  · Estimated EF = 45%. Quantified left ventricular ejection fraction 49%.  · Left ventricular systolic function is low normal.  · The following left ventricular wall segments are mildly hypokinetic: basal inferolateral, mid inferolateral and basal inferior        Assessment:      No diagnosis found.  No orders of the defined types were placed in this encounter.      Plan:   51-year-old male with a medical history of coronary disease, inferior STEMI, ischemic cardiomyopathy with now normal ejection fraction, who presents today for evaluation  of fatigue.  I have reviewed his echocardiogram images from today and he does appear to have a normal ejection fraction and no significant valvular heart disease.  He does complain of fatigue and I think is very likely that he has sleep apnea.    1.  Coronary artery disease.  History of inferior STEMI in 2019 and subsequent stenting of the RCA.  At that time, he was also noted to have a 60% circumflex lesion.  He denies any symptoms of angina and is on good medical therapy.  Stress test in 2020 with no evidence of ischemia  -Continue aspirin 81 mg p.o. daily along with Lipitor therapy at current dose.  -No change in Toprol therapy.      2.  Ischemic cardiomyopathy.  Echocardiogram from today has been reviewed and left ventricular systolic function looks to be about 50 to 55% which is improved.  -Continue Toprol therapy at current dose.  -Euvolemic.  No indication for diuretic therapy.    3.  Fatigue: CBC and TSH reportedly previously ordered by primary.  I will request those labs.  I recommended sleep study which he stated that he was already considering..  Plan on sleep study.

## 2021-10-26 LAB
AORTIC ARCH: 2.2 CM
ASCENDING AORTA: 3.6 CM
BH CV ECHO MEAS - ACS: 2.5 CM
BH CV ECHO MEAS - AO MAX PG (FULL): 1.7 MMHG
BH CV ECHO MEAS - AO MAX PG: 6 MMHG
BH CV ECHO MEAS - AO MEAN PG (FULL): 1.1 MMHG
BH CV ECHO MEAS - AO MEAN PG: 3.9 MMHG
BH CV ECHO MEAS - AO ROOT AREA (BSA CORRECTED): 1.6
BH CV ECHO MEAS - AO ROOT AREA: 9.2 CM^2
BH CV ECHO MEAS - AO ROOT DIAM: 3.4 CM
BH CV ECHO MEAS - AO V2 MAX: 122.2 CM/SEC
BH CV ECHO MEAS - AO V2 MEAN: 93.7 CM/SEC
BH CV ECHO MEAS - AO V2 VTI: 29 CM
BH CV ECHO MEAS - ASC AORTA: 3.6 CM
BH CV ECHO MEAS - AVA(I,A): 2.9 CM^2
BH CV ECHO MEAS - AVA(I,D): 2.9 CM^2
BH CV ECHO MEAS - AVA(V,A): 3 CM^2
BH CV ECHO MEAS - AVA(V,D): 3 CM^2
BH CV ECHO MEAS - BSA(HAYCOCK): 2.2 M^2
BH CV ECHO MEAS - BSA: 2.2 M^2
BH CV ECHO MEAS - BZI_BMI: 27.7 KILOGRAMS/M^2
BH CV ECHO MEAS - BZI_METRIC_HEIGHT: 185.4 CM
BH CV ECHO MEAS - BZI_METRIC_WEIGHT: 95.3 KG
BH CV ECHO MEAS - EDV(CUBED): 108.3 ML
BH CV ECHO MEAS - EDV(MOD-SP2): 92 ML
BH CV ECHO MEAS - EDV(MOD-SP4): 103 ML
BH CV ECHO MEAS - EDV(TEICH): 105.8 ML
BH CV ECHO MEAS - EF(CUBED): 59.9 %
BH CV ECHO MEAS - EF(MOD-BP): 54 %
BH CV ECHO MEAS - EF(MOD-SP2): 55.4 %
BH CV ECHO MEAS - EF(MOD-SP4): 52.4 %
BH CV ECHO MEAS - EF(TEICH): 51.4 %
BH CV ECHO MEAS - ESV(CUBED): 43.4 ML
BH CV ECHO MEAS - ESV(MOD-SP2): 41 ML
BH CV ECHO MEAS - ESV(MOD-SP4): 49 ML
BH CV ECHO MEAS - ESV(TEICH): 51.4 ML
BH CV ECHO MEAS - FS: 26.2 %
BH CV ECHO MEAS - IVS/LVPW: 0.83
BH CV ECHO MEAS - IVSD: 0.75 CM
BH CV ECHO MEAS - LAT PEAK E' VEL: 8.7 CM/SEC
BH CV ECHO MEAS - LV DIASTOLIC VOL/BSA (35-75): 46.9 ML/M^2
BH CV ECHO MEAS - LV MASS(C)D: 130.6 GRAMS
BH CV ECHO MEAS - LV MASS(C)DI: 59.4 GRAMS/M^2
BH CV ECHO MEAS - LV MAX PG: 4.3 MMHG
BH CV ECHO MEAS - LV MEAN PG: 2.8 MMHG
BH CV ECHO MEAS - LV SYSTOLIC VOL/BSA (12-30): 22.3 ML/M^2
BH CV ECHO MEAS - LV V1 MAX: 103.8 CM/SEC
BH CV ECHO MEAS - LV V1 MEAN: 80.9 CM/SEC
BH CV ECHO MEAS - LV V1 VTI: 23.1 CM
BH CV ECHO MEAS - LVIDD: 4.8 CM
BH CV ECHO MEAS - LVIDS: 3.5 CM
BH CV ECHO MEAS - LVLD AP2: 7.5 CM
BH CV ECHO MEAS - LVLD AP4: 7.3 CM
BH CV ECHO MEAS - LVLS AP2: 7 CM
BH CV ECHO MEAS - LVLS AP4: 7.3 CM
BH CV ECHO MEAS - LVOT AREA (M): 3.5 CM^2
BH CV ECHO MEAS - LVOT AREA: 3.6 CM^2
BH CV ECHO MEAS - LVOT DIAM: 2.1 CM
BH CV ECHO MEAS - LVPWD: 0.9 CM
BH CV ECHO MEAS - MED PEAK E' VEL: 8.3 CM/SEC
BH CV ECHO MEAS - MV A DUR: 0.11 SEC
BH CV ECHO MEAS - MV A MAX VEL: 67.9 CM/SEC
BH CV ECHO MEAS - MV DEC SLOPE: 319 CM/SEC^2
BH CV ECHO MEAS - MV DEC TIME: 0.15 SEC
BH CV ECHO MEAS - MV E MAX VEL: 57.5 CM/SEC
BH CV ECHO MEAS - MV E/A: 0.85
BH CV ECHO MEAS - MV MAX PG: 1.8 MMHG
BH CV ECHO MEAS - MV MEAN PG: 0.68 MMHG
BH CV ECHO MEAS - MV P1/2T MAX VEL: 57.3 CM/SEC
BH CV ECHO MEAS - MV P1/2T: 52.6 MSEC
BH CV ECHO MEAS - MV V2 MAX: 66.9 CM/SEC
BH CV ECHO MEAS - MV V2 MEAN: 39.3 CM/SEC
BH CV ECHO MEAS - MV V2 VTI: 21.1 CM
BH CV ECHO MEAS - MVA P1/2T LCG: 3.8 CM^2
BH CV ECHO MEAS - MVA(P1/2T): 4.2 CM^2
BH CV ECHO MEAS - MVA(VTI): 3.9 CM^2
BH CV ECHO MEAS - PA ACC TIME: 0.08 SEC
BH CV ECHO MEAS - PA MAX PG (FULL): 2.6 MMHG
BH CV ECHO MEAS - PA MAX PG: 3.9 MMHG
BH CV ECHO MEAS - PA PR(ACCEL): 44.5 MMHG
BH CV ECHO MEAS - PA V2 MAX: 99.1 CM/SEC
BH CV ECHO MEAS - PAPD(PI EDV): 2.8 MMHG
BH CV ECHO MEAS - PI END-D VEL: 84.2 CM/SEC
BH CV ECHO MEAS - PULM A REVS DUR: 0.09 SEC
BH CV ECHO MEAS - PULM A REVS VEL: 30.3 CM/SEC
BH CV ECHO MEAS - PULM DIAS VEL: 40.9 CM/SEC
BH CV ECHO MEAS - PULM S/D: 1.2
BH CV ECHO MEAS - PULM SYS VEL: 48.2 CM/SEC
BH CV ECHO MEAS - PV RVPEPC: 8.2 SEC
BH CV ECHO MEAS - PVA(V,A): 2.6 CM^2
BH CV ECHO MEAS - PVA(V,D): 2.6 CM^2
BH CV ECHO MEAS - QP/QS: 0.69
BH CV ECHO MEAS - RAP SYSTOLE: 3 MMHG
BH CV ECHO MEAS - RV MAX PG: 1.3 MMHG
BH CV ECHO MEAS - RV MEAN PG: 0.81 MMHG
BH CV ECHO MEAS - RV V1 MAX: 57 CM/SEC
BH CV ECHO MEAS - RV V1 MEAN: 42.5 CM/SEC
BH CV ECHO MEAS - RV V1 VTI: 12.6 CM
BH CV ECHO MEAS - RVOT AREA: 4.6 CM^2
BH CV ECHO MEAS - RVOT DIAM: 2.4 CM
BH CV ECHO MEAS - RVSP: 24 MMHG
BH CV ECHO MEAS - SI(AO): 121.9 ML/M^2
BH CV ECHO MEAS - SI(CUBED): 29.5 ML/M^2
BH CV ECHO MEAS - SI(LVOT): 37.6 ML/M^2
BH CV ECHO MEAS - SI(MOD-SP2): 23.2 ML/M^2
BH CV ECHO MEAS - SI(MOD-SP4): 24.6 ML/M^2
BH CV ECHO MEAS - SI(TEICH): 24.7 ML/M^2
BH CV ECHO MEAS - SV(AO): 267.8 ML
BH CV ECHO MEAS - SV(CUBED): 64.8 ML
BH CV ECHO MEAS - SV(LVOT): 82.6 ML
BH CV ECHO MEAS - SV(MOD-SP2): 51 ML
BH CV ECHO MEAS - SV(MOD-SP4): 54 ML
BH CV ECHO MEAS - SV(RVOT): 57.4 ML
BH CV ECHO MEAS - SV(TEICH): 54.3 ML
BH CV ECHO MEAS - TAPSE (>1.6): 1.7 CM
BH CV ECHO MEAS - TR MAX VEL: 229.4 CM/SEC
BH CV ECHO MEASUREMENTS AVERAGE E/E' RATIO: 6.76
BH CV VAS BP RIGHT ARM: NORMAL MMHG
BH CV XLRA - RV BASE: 3 CM
BH CV XLRA - RV LENGTH: 7.9 CM
BH CV XLRA - RV MID: 3 CM
BH CV XLRA - TDI S': 9.9 CM/SEC
LEFT ATRIUM VOLUME INDEX: 34 ML/M2
MAXIMAL PREDICTED HEART RATE: 169 BPM
SINUS: 3.4 CM
STJ: 3.3 CM
STRESS TARGET HR: 144 BPM

## 2021-12-06 RX ORDER — ASPIRIN 81 MG/1
TABLET, COATED ORAL
Qty: 90 TABLET | Refills: 3 | Status: SHIPPED | OUTPATIENT
Start: 2021-12-06 | End: 2022-03-09 | Stop reason: SDUPTHER

## 2021-12-07 RX ORDER — ATORVASTATIN CALCIUM 40 MG/1
TABLET, FILM COATED ORAL
Qty: 90 TABLET | Refills: 3 | Status: SHIPPED | OUTPATIENT
Start: 2021-12-07 | End: 2022-03-09 | Stop reason: SDUPTHER

## 2021-12-07 RX ORDER — METOPROLOL SUCCINATE 25 MG/1
TABLET, EXTENDED RELEASE ORAL
Qty: 90 TABLET | Refills: 3 | Status: SHIPPED | OUTPATIENT
Start: 2021-12-07 | End: 2022-03-09 | Stop reason: SDUPTHER

## 2022-03-09 RX ORDER — ASPIRIN 81 MG/1
81 TABLET ORAL DAILY
Qty: 90 TABLET | Refills: 3 | Status: SHIPPED | OUTPATIENT
Start: 2022-03-09 | End: 2023-03-28

## 2022-03-09 RX ORDER — ATORVASTATIN CALCIUM 40 MG/1
40 TABLET, FILM COATED ORAL
Qty: 90 TABLET | Refills: 3 | Status: SHIPPED | OUTPATIENT
Start: 2022-03-09 | End: 2022-12-16 | Stop reason: SDUPTHER

## 2022-03-09 RX ORDER — METOPROLOL SUCCINATE 25 MG/1
25 TABLET, EXTENDED RELEASE ORAL DAILY
Qty: 90 TABLET | Refills: 3 | Status: SHIPPED | OUTPATIENT
Start: 2022-03-09 | End: 2022-12-16 | Stop reason: SDUPTHER

## 2022-04-26 ENCOUNTER — OFFICE VISIT (OUTPATIENT)
Dept: CARDIOLOGY | Facility: CLINIC | Age: 52
End: 2022-04-26

## 2022-04-26 VITALS
HEIGHT: 73 IN | SYSTOLIC BLOOD PRESSURE: 126 MMHG | WEIGHT: 219 LBS | DIASTOLIC BLOOD PRESSURE: 78 MMHG | HEART RATE: 56 BPM | BODY MASS INDEX: 29.03 KG/M2

## 2022-04-26 DIAGNOSIS — I25.5 ISCHEMIC CARDIOMYOPATHY: ICD-10-CM

## 2022-04-26 DIAGNOSIS — I21.11 ST ELEVATION MYOCARDIAL INFARCTION INVOLVING RIGHT CORONARY ARTERY: ICD-10-CM

## 2022-04-26 DIAGNOSIS — I25.10 CORONARY ARTERY DISEASE INVOLVING NATIVE CORONARY ARTERY OF NATIVE HEART WITHOUT ANGINA PECTORIS: Primary | ICD-10-CM

## 2022-04-26 PROCEDURE — 99214 OFFICE O/P EST MOD 30 MIN: CPT | Performed by: NURSE PRACTITIONER

## 2022-04-26 PROCEDURE — 93000 ELECTROCARDIOGRAM COMPLETE: CPT | Performed by: NURSE PRACTITIONER

## 2022-04-26 NOTE — PROGRESS NOTES
Date of Office Visit: 2022  Encounter Provider: MITRA Bush  Place of Service: [facility] CARDIOLOGY  Patient Name: Baron Abdullahi  :1970    Chief Complaint   Patient presents with   • Coronary Artery Disease   :     HPI: Baron Abdullahi is a 51 y.o. male.  He is a patient of Dr. Linton's whom we follow for the management of coronary artery disease.  In , he suffered an inferior STEMI due to a subtotal occlusion of the distal RCA.  He underwent drug-eluting stenting of the distal RCA extending into the PDA.  At that time, he was noted to have mildly decreased LV function with an EF of 45 to 50%.   He was last seen in the office by Dr. Linton in 2021 at which time he was doing well with no complaints of angina or heart failure.  He was reporting fatigue for which Dr. Linton recommended a sleep study.  Echocardiogram that day demonstrated normal LV function with an EF of 51 to 55% and no significant valvular abnormalities.  No changes were made to his regimen, and he was advised to follow-up in 6 months.   He has been doing well.  He denies any chest pain, shortness of breath, palpitations, edema, dizziness, or syncope.  He never had the sleep study.  Evidently the fatigue seems to have worked itself out.  He was also started on Lexapro for anxiety which helped tremendously.  For exercise, he walks, bikes, and works in the yard without any limitation.    Past Medical History:   Diagnosis Date   • Coronary artery disease involving native coronary artery of native heart without angina pectoris 2019   • Gout        Past Surgical History:   Procedure Laterality Date   • CARDIAC CATHETERIZATION N/A 2019    Procedure: Left Heart Cath;  Surgeon: Erasto Linton MD;  Location: [facility] CATH INVASIVE LOCATION;  Service: Cardiovascular   • CARDIAC CATHETERIZATION N/A 2019    Procedure: Coronary angiography;  Surgeon: Erasto Linton MD;   Location: [facility] CATH INVASIVE LOCATION;  Service: Cardiovascular   • CARDIAC CATHETERIZATION N/A 2019    Procedure: Stent ARIEL coronary;  Surgeon: Erasto Linton MD;  Location: [facility] CATH INVASIVE LOCATION;  Service: Cardiovascular   • CARDIAC CATHETERIZATION N/A 2019    Procedure: Left ventriculography;  Surgeon: Erasto Linton MD;  Location: [facility] CATH INVASIVE LOCATION;  Service: Cardiovascular   • CORONARY ANGIOPLASTY  19   • CORONARY STENT PLACEMENT  19   • JOINT REPLACEMENT      Left       Social History     Socioeconomic History   • Marital status:    Tobacco Use   • Smoking status: Former Smoker     Packs/day: 0.50     Years: 10.00     Pack years: 5.00     Types: Cigarettes     Start date: 1988     Quit date: 4/15/2010     Years since quittin.0   • Smokeless tobacco: Never Used   • Tobacco comment: on and off smoker and chew until quit date   Substance and Sexual Activity   • Alcohol use: Yes     Alcohol/week: 35.0 standard drinks     Types: 15 Cans of beer, 20 Standard drinks or equivalent per week     Comment: caffeine use    • Drug use: Never   • Sexual activity: Yes     Partners: Female     Birth control/protection: None       Family History   Problem Relation Age of Onset   • Heart disease Father         Dbl bypass   • Heart failure Father    • Heart attack Maternal Grandfather         at age 46   • Heart failure Maternal Grandfather    • Heart disease Maternal Grandfather    • No Known Problems Mother        Review of Systems   Constitutional: Negative.   Cardiovascular: Negative.  Negative for chest pain, dyspnea on exertion, leg swelling, orthopnea, paroxysmal nocturnal dyspnea and syncope.   Respiratory: Negative.    Hematologic/Lymphatic: Negative for bleeding problem.   Musculoskeletal: Negative for falls.   Gastrointestinal: Negative for melena.   Neurological: Negative for dizziness and light-headedness.       Allergies   Allergen Reactions  "  • Morphine Hives         Current Outpatient Medications:   •  aspirin (Aspirin Low Dose) 81 MG EC tablet, Take 1 tablet by mouth Daily., Disp: 90 tablet, Rfl: 3  •  atorvastatin (LIPITOR) 40 MG tablet, Take 1 tablet by mouth every night at bedtime., Disp: 90 tablet, Rfl: 3  •  CVS D3 10 MCG (400 UNIT) capsule, TAKE 2 TABLETS BY MOUTH EVERY DAY, Disp: 180 each, Rfl: 1  •  escitalopram (LEXAPRO) 20 MG tablet, Take 20 mg by mouth Daily., Disp: , Rfl:   •  metoprolol succinate XL (TOPROL-XL) 25 MG 24 hr tablet, Take 1 tablet by mouth Daily., Disp: 90 tablet, Rfl: 3  •  probenecid (BENEMID) 500 MG tablet, Take 500 mg by mouth 2 (Two) Times a Day., Disp: , Rfl:       Objective:     Vitals:    04/26/22 0935   BP: 126/78   Pulse: 56   Weight: 99.3 kg (219 lb)   Height: 185.4 cm (73\")     Body mass index is 28.89 kg/m².    PHYSICAL EXAM:    Neck:      Vascular: No JVD.   Pulmonary:      Effort: Pulmonary effort is normal.      Breath sounds: Normal breath sounds.   Cardiovascular:      Normal rate. Regular rhythm.      Murmurs: There is no murmur.      No gallop. No click. No rub.   Pulses:     Intact distal pulses.           ECG 12 Lead    Date/Time: 4/26/2022 9:46 AM  Performed by: Elisa Carlos APRN  Authorized by: Elisa Carlos APRN   Comparison: compared with previous ECG from 10/25/2021  Similar to previous ECG  Rhythm: sinus rhythm  Rate: normal  BPM: 56  Q waves: III    T inversion: III  Comments: Indication: CAD              Assessment:       Diagnosis Plan   1. Coronary artery disease involving native coronary artery of native heart without angina pectoris  ECG 12 Lead   2. ST elevation myocardial infarction involving right coronary artery (HCC)     3. Ischemic cardiomyopathy       Orders Placed This Encounter   Procedures   • ECG 12 Lead     This order was created via procedure documentation     Order Specific Question:   Release to patient     Answer:   Immediate          Plan:       1.  Coronary " artery disease.  History of inferior STEMI in 2019 with PCI of the distal RCA extending into the PDA.  He denies any symptoms of angina.  Continue aspirin, atorvastatin, and metoprolol.  I will request labs from his PCP to ensure adequate cholesterol control.      2.  Ischemic cardiomyopathy.  Echocardiogram from October demonstrated normalization of his EF.      I think he is doing well.  He denies any symptoms angina or heart failure.  Changes today, and he will follow-up with Dr. Linton in 6 months.      As always, it has been a pleasure to participate in your patient's care.      Sincerely,         MITRA Casillas

## 2022-12-16 ENCOUNTER — OFFICE VISIT (OUTPATIENT)
Dept: CARDIOLOGY | Facility: CLINIC | Age: 52
End: 2022-12-16

## 2022-12-16 VITALS
HEIGHT: 73 IN | SYSTOLIC BLOOD PRESSURE: 122 MMHG | DIASTOLIC BLOOD PRESSURE: 80 MMHG | HEART RATE: 59 BPM | BODY MASS INDEX: 29.77 KG/M2 | WEIGHT: 224.6 LBS

## 2022-12-16 DIAGNOSIS — I25.5 ISCHEMIC CARDIOMYOPATHY: ICD-10-CM

## 2022-12-16 DIAGNOSIS — I25.2 HISTORY OF ST ELEVATION MYOCARDIAL INFARCTION (STEMI): ICD-10-CM

## 2022-12-16 DIAGNOSIS — I25.10 CORONARY ARTERY DISEASE INVOLVING NATIVE CORONARY ARTERY OF NATIVE HEART WITHOUT ANGINA PECTORIS: Primary | ICD-10-CM

## 2022-12-16 PROCEDURE — 93000 ELECTROCARDIOGRAM COMPLETE: CPT | Performed by: NURSE PRACTITIONER

## 2022-12-16 PROCEDURE — 99214 OFFICE O/P EST MOD 30 MIN: CPT | Performed by: NURSE PRACTITIONER

## 2022-12-16 RX ORDER — METOPROLOL SUCCINATE 25 MG/1
25 TABLET, EXTENDED RELEASE ORAL DAILY
Qty: 90 TABLET | Refills: 3 | Status: SHIPPED | OUTPATIENT
Start: 2022-12-16

## 2022-12-16 RX ORDER — ATORVASTATIN CALCIUM 40 MG/1
40 TABLET, FILM COATED ORAL
Qty: 90 TABLET | Refills: 3 | Status: SHIPPED | OUTPATIENT
Start: 2022-12-16

## 2022-12-16 NOTE — PROGRESS NOTES
Date of Office Visit: 2022  Encounter Provider: MITRA No  Place of Service: [facility] CARDIOLOGY  Patient Name: Baron Abdullahi  :1970    No chief complaint on file.  : follow up    HPI: Baron Abdullahi is a 52 y.o. male who is a patient of Dr. Linton.  He is new to me today and presents for 6-month office follow-up.  He has history of coronary artery disease, ischemic cardiomyopathy and anxiety.  In , he suffered an inferior STEMI due to subtotal occlusion of the distal RCA.  He underwent drug-eluting stent of the distal RCA extending into the PDA.  At that time, he was also noted to have 60% mid left circumflex lesion.  Follow-up echocardiogram revealed mildly decreased LV function with an EF of 45 to 50%.    Echocardiogram in 2021 demonstrated normal LV function with an EF of 51 to 55%, no significant valvular abnormalities.  It was recommended for him to have an overnight sleep study due to his complaints of fatigue.  On his last office visit with MITRA Casillas in April, patient reported not having a sleep study and fatigue had improved.  He was exercising on a regular basis.      Patient presents today with no complaints of chest pain, pressure, dizziness, shortness of breath or lower extremity edema.  He underwent sleep study and has been sleeping with CPAP since September.  He has noticed a world of difference in the way that he feels since wearing CPAP.  He admits to not exercising on a normal basis and plans to start exercising.  Pressures well controlled.  Lipid panel checked by his PCP.  He brought in list of his labs and recent lipid panel in target range except LDL 91 and HDL 44.      Previous testing and notes have been reviewed by me.   Past Medical History:   Diagnosis Date   • Coronary artery disease involving native coronary artery of native heart without angina pectoris 2019   • Gout        Past Surgical History:    Procedure Laterality Date   • CARDIAC CATHETERIZATION N/A 2019    Procedure: Left Heart Cath;  Surgeon: Erasto Linton MD;  Location:  [facility] CATH INVASIVE LOCATION;  Service: Cardiovascular   • CARDIAC CATHETERIZATION N/A 2019    Procedure: Coronary angiography;  Surgeon: Erasto Linton MD;  Location:  [facility] CATH INVASIVE LOCATION;  Service: Cardiovascular   • CARDIAC CATHETERIZATION N/A 2019    Procedure: Stent ARIEL coronary;  Surgeon: Erasto Linton MD;  Location:  [facility] CATH INVASIVE LOCATION;  Service: Cardiovascular   • CARDIAC CATHETERIZATION N/A 2019    Procedure: Left ventriculography;  Surgeon: Erasto Linton MD;  Location:  [facility] CATH INVASIVE LOCATION;  Service: Cardiovascular   • CORONARY ANGIOPLASTY  19   • CORONARY STENT PLACEMENT  19   • JOINT REPLACEMENT      Left       Social History     Socioeconomic History   • Marital status:    Tobacco Use   • Smoking status: Former     Packs/day: 0.50     Years: 10.00     Pack years: 5.00     Types: Cigarettes     Start date: 1988     Quit date: 4/15/2010     Years since quittin.6   • Smokeless tobacco: Never   • Tobacco comments:     on and off smoker and chew until quit date   Substance and Sexual Activity   • Alcohol use: Yes     Alcohol/week: 35.0 standard drinks     Types: 15 Cans of beer, 20 Standard drinks or equivalent per week     Comment: caffeine use    • Drug use: Never   • Sexual activity: Yes     Partners: Female     Birth control/protection: None       Family History   Problem Relation Age of Onset   • Heart disease Father         Dbl bypass   • Heart failure Father    • Heart attack Maternal Grandfather         at age 46   • Heart failure Maternal Grandfather    • Heart disease Maternal Grandfather    • No Known Problems Mother        Review of Systems   Constitutional: Negative.   HENT: Negative.    Eyes: Negative.    Respiratory: Negative.    Endocrine: Negative.     Hematologic/Lymphatic: Negative.    Skin: Negative.    Musculoskeletal: Negative.    Gastrointestinal: Negative.    Genitourinary: Negative.    Neurological: Negative.    Psychiatric/Behavioral: Negative.    Allergic/Immunologic: Negative.        Allergies   Allergen Reactions   • Morphine Hives         Current Outpatient Medications:   •  aspirin (Aspirin Low Dose) 81 MG EC tablet, Take 1 tablet by mouth Daily., Disp: 90 tablet, Rfl: 3  •  atorvastatin (LIPITOR) 40 MG tablet, Take 1 tablet by mouth every night at bedtime., Disp: 90 tablet, Rfl: 3  •  CVS D3 10 MCG (400 UNIT) capsule, TAKE 2 TABLETS BY MOUTH EVERY DAY, Disp: 180 each, Rfl: 1  •  escitalopram (LEXAPRO) 20 MG tablet, Take 20 mg by mouth Daily., Disp: , Rfl:   •  metoprolol succinate XL (TOPROL-XL) 25 MG 24 hr tablet, Take 1 tablet by mouth Daily., Disp: 90 tablet, Rfl: 3  •  probenecid (BENEMID) 500 MG tablet, Take 500 mg by mouth 2 (Two) Times a Day., Disp: , Rfl:       Objective:     There were no vitals filed for this visit.  There is no height or weight on file to calculate BMI.     2D Echocardiogram 10/25/2021:  • There is severe hypokinesis of the basal inferior wall and basal inferoseptum  • Left ventricular ejection fraction appears to be 51 - 55%. Left ventricular systolic function is low normal.  • Normal right ventricular cavity size and systolic function noted.  • The left atrial cavity is mildly dilated.  • Calculated right ventricular systolic pressure from tricuspid regurgitation is 24.0 mmHg.  • The ascending aorta is borderline dilated at 3.6 cm  • There is no evidence of pericardial effusion.      Cardiolite exercise stress test 8/19/2020:  • Myocardial perfusion imaging indicates a normal myocardial perfusion study with no evidence of ischemia.  • Left ventricular ejection fraction is normal (Calculated EF = 51%).  • Impressions are consistent with a low risk study.      2D Echocardiogram 12/9/2019:  • Estimated EF = 45%.  Quantified left ventricular ejection fraction 49%.  • Left ventricular systolic function is low normal.  • The following left ventricular wall segments are mildly hypokinetic: basal inferolateral, mid inferolateral and basal inferior.    Left Heart Cath 12/08/2019:  Left main: Large caliber vessel that bifurcates to an LAD and circumflex.  Normal  LAD: Medium caliber vessel gives rise to a small caliber diagonal branch.  The proximal LAD has a discrete 40% stenosis.  The mid to distal LAD are normal  LCX: Medium caliber vessel gives rise to a medium caliber OM branch distally.  There is a discrete 60% mid vessel stenosis.  RCA: Large-caliber vessel that is dominant.  Gives rise to a small to medium caliber PDA and small caliber RPL branch.  The RCA is subtotally occluded with a diffuse 99% distal stenosis.  HALEY I flow.  30% proximal PDA stenosis.     Left ventricular angiography: Normal left ventricular size and systolic function.  Left ventricular ejection fraction is 55%.  Mild inferior wall hypokinesis    Intervention:  3.0 x 33 mm Xience Lainey drug-eluting stent was then deployed across the distal RCA into the PDA    PHYSICAL EXAM:    Constitutional:       Appearance: Healthy appearance. Not in distress.   Neck:      Vascular: No JVR. JVD normal.   Pulmonary:      Effort: Pulmonary effort is normal.      Breath sounds: Normal breath sounds. No wheezing. No rhonchi. No rales.   Chest:      Chest wall: Not tender to palpatation.   Cardiovascular:      PMI at left midclavicular line. Normal rate. Regular rhythm. Normal S1. Normal S2.      Murmurs: There is no murmur.      No gallop. No click. No rub.   Pulses:     Intact distal pulses.   Edema:     Peripheral edema absent.   Abdominal:      General: Bowel sounds are normal.      Palpations: Abdomen is soft.      Tenderness: There is no abdominal tenderness.   Musculoskeletal: Normal range of motion.         General: No tenderness. Skin:     General: Skin is warm  and dry.   Neurological:      General: No focal deficit present.      Mental Status: Alert and oriented to person, place and time.           ECG 12 Lead    Date/Time: 12/16/2022 12:40 PM  Performed by: Nasreen Graves APRN  Authorized by: Nasreen Graves APRN   Comparison: compared with previous ECG from 4/26/2022  Rhythm: sinus rhythm  Rate: normal  Conduction: conduction normal  ST Segments: ST segments normal  T Waves: T waves normal                Assessment:       Diagnosis Plan   1. Coronary artery disease involving native coronary artery of native heart without angina pectoris        2. Ischemic cardiomyopathy        3. History of ST elevation myocardial infarction (STEMI)          No orders of the defined types were placed in this encounter.         Plan:       1.  Coronary artery disease: PCI/ARIEL to distal RCA in 2019.  On aspirin, statin and beta-blocker.  Normal Cardiolite stress test 8/2020.  No angina.  Stable EKG  2.  History of inferior STEMI  3.  Ischemic cardiomyopathy: EF 45%-> 51-55%  4.  Hyperlipidemia: Panel in target range except LDL 91 and HDL 44.  On statin therapy.  Increase exercise    Mr. Abdullahi will follow up with Dr. Linton in 1 year.  He will call sooner for any questions or concerns.          Your medication list          Accurate as of December 16, 2022  8:38 AM. If you have any questions, ask your nurse or doctor.            CONTINUE taking these medications      Instructions Last Dose Given Next Dose Due   aspirin 81 MG EC tablet  Commonly known as: Aspirin Low Dose      Take 1 tablet by mouth Daily.       atorvastatin 40 MG tablet  Commonly known as: LIPITOR      Take 1 tablet by mouth every night at bedtime.       CVS D3 10 MCG (400 UNIT) capsule  Generic drug: Cholecalciferol      TAKE 2 TABLETS BY MOUTH EVERY DAY       escitalopram 20 MG tablet  Commonly known as: LEXAPRO      Take 20 mg by mouth Daily.       metoprolol succinate XL 25 MG 24 hr tablet  Commonly known as:  TOPROL-XL      Take 1 tablet by mouth Daily.       probenecid 500 MG tablet  Commonly known as: BENEMID      Take 500 mg by mouth 2 (Two) Times a Day.                As always, it has been a pleasure to participate in your patient's care.      Sincerely,       MITRA Cannon

## 2023-03-28 RX ORDER — ASPIRIN 81 MG/1
TABLET, COATED ORAL
Qty: 90 TABLET | Refills: 3 | Status: SHIPPED | OUTPATIENT
Start: 2023-03-28

## 2023-12-26 RX ORDER — METOPROLOL SUCCINATE 25 MG/1
25 TABLET, EXTENDED RELEASE ORAL DAILY
Qty: 90 TABLET | Refills: 3 | Status: SHIPPED | OUTPATIENT
Start: 2023-12-26

## 2024-04-01 RX ORDER — ASPIRIN 81 MG/1
TABLET, COATED ORAL
Qty: 90 TABLET | Refills: 3 | Status: SHIPPED | OUTPATIENT
Start: 2024-04-01

## 2024-05-16 ENCOUNTER — OFFICE VISIT (OUTPATIENT)
Age: 54
End: 2024-05-16
Payer: COMMERCIAL

## 2024-05-16 VITALS
SYSTOLIC BLOOD PRESSURE: 118 MMHG | HEART RATE: 61 BPM | DIASTOLIC BLOOD PRESSURE: 80 MMHG | HEIGHT: 73 IN | BODY MASS INDEX: 30.48 KG/M2 | WEIGHT: 230 LBS

## 2024-05-16 DIAGNOSIS — I25.10 CORONARY ARTERY DISEASE INVOLVING NATIVE CORONARY ARTERY OF NATIVE HEART WITHOUT ANGINA PECTORIS: Primary | ICD-10-CM

## 2024-05-16 DIAGNOSIS — I25.5 ISCHEMIC CARDIOMYOPATHY: ICD-10-CM

## 2024-05-16 PROCEDURE — 99214 OFFICE O/P EST MOD 30 MIN: CPT | Performed by: INTERNAL MEDICINE

## 2024-05-16 PROCEDURE — 93000 ELECTROCARDIOGRAM COMPLETE: CPT | Performed by: INTERNAL MEDICINE

## 2024-05-16 NOTE — PROGRESS NOTES
Date of Office Visit: 24    Encounter Provider: Erasto Linton MD  Place of Service: [facility] CARDIOLOGY  Patient Name: Baron Abdullahi  :1970    Chief complaint  Coronary artery disease   History of inferior STEMI    HPI: Baron Abdullahi is a 53 y.o. male who is a patient of Dr. Linton.  He is new to me today and presents for 6-month office follow-up.  He has history of coronary artery disease, ischemic cardiomyopathy and anxiety.  In , he suffered an inferior STEMI due to subtotal occlusion of the distal RCA.  He underwent drug-eluting stent of the distal RCA extending into the PDA.  At that time, he was also noted to have 60% mid left circumflex lesion.  Follow-up echocardiogram revealed mildly decreased LV function with an EF of 45 to 50%.    Echocardiogram in 2021 demonstrated normal LV function with an EF of 51 to 55%, no significant valvular abnormalities.  It was recommended for him to have an overnight sleep study due to his complaints of fatigue.  He has been doing very well since our last visit.  He denies any chest pain or dyspnea.  His blood pressure is well-controlled    Previous testing and notes have been reviewed by me.     Past Medical History:   Diagnosis Date    Coronary artery disease involving native coronary artery of native heart without angina pectoris 2019    Gout        Past Surgical History:   Procedure Laterality Date    CARDIAC CATHETERIZATION N/A 2019    Procedure: Left Heart Cath;  Surgeon: Erasto Linton MD;  Location: [facility] INVASIVE LOCATION;  Service: Cardiovascular    CARDIAC CATHETERIZATION N/A 2019    Procedure: Coronary angiography;  Surgeon: Ersato Linton MD;  Location: [facility] INVASIVE LOCATION;  Service: Cardiovascular    CARDIAC CATHETERIZATION N/A 2019    Procedure: Stent ARIEL coronary;  Surgeon: Erasto Linton MD;  Location: [facility] INVASIVE LOCATION;   Service: Cardiovascular    CARDIAC CATHETERIZATION N/A 2019    Procedure: Left ventriculography;  Surgeon: Erasto Linton MD;  Location: [facility] INVASIVE LOCATION;  Service: Cardiovascular    CORONARY ANGIOPLASTY  19    CORONARY STENT PLACEMENT  19    JOINT REPLACEMENT      Left       Social History     Socioeconomic History    Marital status:    Tobacco Use    Smoking status: Former     Current packs/day: 0.00     Average packs/day: 0.5 packs/day for 21.8 years (10.9 ttl pk-yrs)     Types: Cigarettes     Start date: 1988     Quit date: 4/15/2010     Years since quittin.0    Smokeless tobacco: Never    Tobacco comments:     on and off smoker and chew until quit date   Substance and Sexual Activity    Alcohol use: Yes     Alcohol/week: 35.0 standard drinks of alcohol     Types: 15 Cans of beer, 20 Standard drinks or equivalent per week     Comment: caffeine use     Drug use: Never    Sexual activity: Yes     Partners: Female     Birth control/protection: None       Family History   Problem Relation Age of Onset    Heart disease Father         Dbl bypass    Heart failure Father     Heart attack Maternal Grandfather         at age 46    Heart failure Maternal Grandfather     Heart disease Maternal Grandfather     No Known Problems Mother        Review of Systems   Constitutional: Negative. Negative for fever and malaise/fatigue.   HENT: Negative.  Negative for nosebleeds and sore throat.    Eyes: Negative.  Negative for blurred vision and double vision.   Cardiovascular:  Negative for chest pain, claudication, palpitations and syncope.   Respiratory: Negative.  Negative for cough, shortness of breath and snoring.    Endocrine: Negative.  Negative for cold intolerance, heat intolerance and polydipsia.   Hematologic/Lymphatic: Negative.    Skin: Negative.  Negative for itching, poor wound healing and rash.   Musculoskeletal: Negative.  Negative for joint pain, joint swelling,  "muscle weakness and myalgias.   Gastrointestinal: Negative.  Negative for abdominal pain, melena, nausea and vomiting.   Genitourinary: Negative.    Neurological: Negative.  Negative for light-headedness, loss of balance, seizures, vertigo and weakness.   Psychiatric/Behavioral: Negative.  Negative for altered mental status and depression.    Allergic/Immunologic: Negative.        Allergies   Allergen Reactions    Morphine Hives         Current Outpatient Medications:     Aspirin Low Dose 81 MG EC tablet, TAKE 1 TABLET BY MOUTH EVERY DAY, Disp: 90 tablet, Rfl: 3    atorvastatin (LIPITOR) 40 MG tablet, Take 1 tablet by mouth every night at bedtime., Disp: 90 tablet, Rfl: 3    [employer]S D3 10 MCG (400 UNIT) capsule, TAKE 2 TABLETS BY MOUTH EVERY DAY, Disp: 180 each, Rfl: 1    escitalopram (LEXAPRO) 20 MG tablet, Take 1 tablet by mouth Daily., Disp: , Rfl:     metoprolol succinate XL (TOPROL-XL) 25 MG 24 hr tablet, TAKE 1 TABLET BY MOUTH DAILY, Disp: 90 tablet, Rfl: 3    probenecid (BENEMID) 500 MG tablet, Take 1 tablet by mouth 2 (Two) Times a Day., Disp: , Rfl:       Objective:     Vitals:    05/16/24 1203   BP: 118/80   Pulse: 61   Weight: 104 kg (230 lb)   Height: 185.4 cm (73\")     Body mass index is 30.34 kg/m².     PHYSICAL EXAM:    Constitutional:       Appearance: Healthy appearance. Not in distress.   Neck:      Vascular: No JVR. JVD normal.   Pulmonary:      Effort: Pulmonary effort is normal.      Breath sounds: Normal breath sounds. No wheezing. No rhonchi. No rales.   Chest:      Chest wall: Not tender to palpatation.   Cardiovascular:      PMI at left midclavicular line. Normal rate. Regular rhythm. Normal S1. Normal S2.       Murmurs: There is no murmur.      No gallop.  No click. No rub.   Pulses:     Intact distal pulses.   Edema:     Peripheral edema absent.   Abdominal:      General: Bowel sounds are normal.      Palpations: Abdomen is soft.      Tenderness: There is no abdominal tenderness. "   Musculoskeletal: Normal range of motion.         General: No tenderness. Skin:     General: Skin is warm and dry.   Neurological:      General: No focal deficit present.      Mental Status: Alert and oriented to person, place and time.           ECG 12 Lead    Date/Time: 5/16/2024 12:32 PM  Performed by: Erasto Linton MD    Authorized by: Erasto Linton MD  Comparison: compared with previous ECG from 12/16/2022  Similar to previous ECG  Rhythm: sinus rhythm  Rate: normal  QRS axis: left          2D Echocardiogram 10/25/2021:  There is severe hypokinesis of the basal inferior wall and basal inferoseptum  Left ventricular ejection fraction appears to be 51 - 55%. Left ventricular systolic function is low normal.  Normal right ventricular cavity size and systolic function noted.  The left atrial cavity is mildly dilated.  Calculated right ventricular systolic pressure from tricuspid regurgitation is 24.0 mmHg.  The ascending aorta is borderline dilated at 3.6 cm  There is no evidence of pericardial effusion.      Assessment:      Plan:       1.  Coronary artery disease: PCI/ARIEL to distal RCA in 2019.   Normal Cardiolite stress test 8/2020.  No angina.  Stable EKG  -Continue aspirin 81 mg p.o. daily and Lipitor 40 mg p.o. nightly  - Tolerating metoprolol succinate at current dose.  No changes.  Resting heart rate 61    2.  History of inferior STEMI    3.  Ischemic cardiomyopathy: EF 45%-> 51-55%.  Currently euvolemic.    4.  Hyperlipidemia: Monitored by primary.  Continue atorvastatin 40 mg p.o. nightly.  Goal LDL for him is less than 70.         Your medication list            Accurate as of May 16, 2024 12:32 PM. If you have any questions, ask your nurse or doctor.                CONTINUE taking these medications        Instructions Last Dose Given Next Dose Due   Aspirin Low Dose 81 MG EC tablet  Generic drug: aspirin      TAKE 1 TABLET BY MOUTH EVERY DAY       atorvastatin 40 MG tablet  Commonly  known as: LIPITOR      Take 1 tablet by mouth every night at bedtime.       CVS D3 10 MCG (400 UNIT) capsule  Generic drug: Cholecalciferol      TAKE 2 TABLETS BY MOUTH EVERY DAY       escitalopram 20 MG tablet  Commonly known as: LEXAPRO      Take 1 tablet by mouth Daily.       metoprolol succinate XL 25 MG 24 hr tablet  Commonly known as: TOPROL-XL      TAKE 1 TABLET BY MOUTH DAILY       probenecid 500 MG tablet  Commonly known as: BENEMID      Take 1 tablet by mouth 2 (Two) Times a Day.

## 2024-12-09 RX ORDER — ATORVASTATIN CALCIUM 40 MG/1
40 TABLET, FILM COATED ORAL
Qty: 90 TABLET | Refills: 2 | Status: SHIPPED | OUTPATIENT
Start: 2024-12-09

## 2025-01-02 RX ORDER — METOPROLOL SUCCINATE 25 MG/1
25 TABLET, EXTENDED RELEASE ORAL DAILY
Qty: 90 TABLET | Refills: 3 | Status: SHIPPED | OUTPATIENT
Start: 2025-01-02

## 2025-03-31 RX ORDER — ASPIRIN 81 MG/1
81 TABLET, COATED ORAL DAILY
Qty: 90 TABLET | Refills: 3 | Status: SHIPPED | OUTPATIENT
Start: 2025-03-31

## 2025-05-14 ENCOUNTER — OFFICE VISIT (OUTPATIENT)
Dept: CARDIOLOGY | Age: 55
End: 2025-05-14
Payer: COMMERCIAL

## 2025-05-14 VITALS
HEART RATE: 54 BPM | OXYGEN SATURATION: 94 % | HEIGHT: 73 IN | WEIGHT: 230 LBS | DIASTOLIC BLOOD PRESSURE: 90 MMHG | BODY MASS INDEX: 30.48 KG/M2 | SYSTOLIC BLOOD PRESSURE: 122 MMHG

## 2025-05-14 DIAGNOSIS — I25.10 CORONARY ARTERY DISEASE INVOLVING NATIVE CORONARY ARTERY OF NATIVE HEART WITHOUT ANGINA PECTORIS: Primary | ICD-10-CM

## 2025-05-14 PROCEDURE — 93000 ELECTROCARDIOGRAM COMPLETE: CPT | Performed by: NURSE PRACTITIONER

## 2025-05-14 PROCEDURE — 99214 OFFICE O/P EST MOD 30 MIN: CPT | Performed by: NURSE PRACTITIONER

## 2025-05-14 NOTE — PROGRESS NOTES
Date of Office Visit: 2025  Encounter Provider: MITRA Flores  Place of Service: [facility] CARDIOLOGY  Patient Name: Baron Abdullahi  :1970    Chief complaint: Coronary artery disease    HPI: Baron Abdullahi is a 54 y.o. male who is a patient of  Dr. Linton and is new to me today.  He has a history of coronary disease, ischemic cardiomyopathy and anxiety.  In  he had an inferior ST elevation MI due to a subtotal occlusion of the distal RCA.  He underwent drug-eluting stenting of the distal RCA extending into the PDA.  At that time it was also noted to have a 60% mid circumflex EF was 45 to 50%.    Echocardiogram in  showed resolved LV function of 51 to 55%.  It was recommended he have an overnight sleep study at his last visit due to complaints of fatigue.  His blood pressure has been well-controlled.  He comes in today for follow-up.  His PCP manages his cholesterol and does regular lab work.  He exercises going to the gym 1 to 2 days a week.  However his physical activity has been limited lately due to plantar fasciitis.  He denies any chest pain, pressure or tightness or shortness of breath.  He has been compliant with his medications.    Previous testing and notes have been reviewed by me.   Obtaining lab results from PCP  Past Medical History:   Diagnosis Date    Coronary artery disease involving native coronary artery of native heart without angina pectoris 2019    Gout        Past Surgical History:   Procedure Laterality Date    CARDIAC CATHETERIZATION N/A 2019    Procedure: Left Heart Cath;  Surgeon: Erasto Linton MD;  Location: [facility] CATH INVASIVE LOCATION;  Service: Cardiovascular    CARDIAC CATHETERIZATION N/A 2019    Procedure: Coronary angiography;  Surgeon: Erasto Linton MD;  Location: [facility] CATH INVASIVE LOCATION;  Service: Cardiovascular    CARDIAC CATHETERIZATION N/A 2019    Procedure: Stent ARIEL  coronary;  Surgeon: Erasto Linton MD;  Location:  [facility] CATH INVASIVE LOCATION;  Service: Cardiovascular    CARDIAC CATHETERIZATION N/A 12/8/2019    Procedure: Left ventriculography;  Surgeon: Erasto Linton MD;  Location: [facility] CATH INVASIVE LOCATION;  Service: Cardiovascular    CORONARY ANGIOPLASTY  12/8/19    CORONARY STENT PLACEMENT  12/8/19    JOINT REPLACEMENT      Left       Social History     Socioeconomic History    Marital status:    Tobacco Use    Smoking status: Former     Current packs/day: 0.00     Average packs/day: 0.5 packs/day for 21.8 years (10.9 ttl pk-yrs)     Types: Cigarettes     Start date: 7/1/1988     Quit date: 4/15/2010     Years since quitting: 15.0    Smokeless tobacco: Never    Tobacco comments:     on and off smoker and chew until quit date   Vaping Use    Vaping status: Never Used   Substance and Sexual Activity    Alcohol use: Yes     Alcohol/week: 35.0 standard drinks of alcohol     Types: 15 Cans of beer, 20 Unspecified drink type per week     Comment: caffeine use     Drug use: Never    Sexual activity: Yes     Partners: Female     Birth control/protection: None       Family History   Problem Relation Age of Onset    Heart disease Father         Dbl bypass    Heart failure Father     Heart attack Maternal Grandfather         at age 46    Heart failure Maternal Grandfather     Heart disease Maternal Grandfather     No Known Problems Mother        Review of Systems   Constitutional: Negative for diaphoresis and malaise/fatigue.   Cardiovascular:  Negative for chest pain, claudication, dyspnea on exertion, irregular heartbeat, leg swelling, near-syncope, orthopnea, palpitations, paroxysmal nocturnal dyspnea and syncope.   Respiratory:  Negative for cough, shortness of breath and sleep disturbances due to breathing.    Musculoskeletal:  Negative for falls.   Neurological:  Negative for dizziness and weakness.   Psychiatric/Behavioral:  Negative for altered  "mental status and substance abuse.        Allergies   Allergen Reactions    Morphine Hives         Current Outpatient Medications:     aspirin (Aspirin Low Dose) 81 MG EC tablet, TAKE 1 TABLET BY MOUTH DAILY, Disp: 90 tablet, Rfl: 3    atorvastatin (LIPITOR) 40 MG tablet, Take 1 tablet by mouth every night at bedtime. NEEDS CHOLESTEROL LABS, Disp: 90 tablet, Rfl: 2    CVS D3 10 MCG (400 UNIT) capsule, TAKE 2 TABLETS BY MOUTH EVERY DAY, Disp: 180 each, Rfl: 1    escitalopram (LEXAPRO) 20 MG tablet, Take 1 tablet by mouth Daily., Disp: , Rfl:     metoprolol succinate XL (TOPROL-XL) 25 MG 24 hr tablet, TAKE 1 TABLET BY MOUTH DAILY, Disp: 90 tablet, Rfl: 3    probenecid (BENEMID) 500 MG tablet, Take 1 tablet by mouth 2 (Two) Times a Day., Disp: , Rfl:         Objective:     Vitals:    05/14/25 1127   BP: 122/90   Pulse: 54   SpO2: 94%   Weight: 104 kg (230 lb)   Height: 185.4 cm (73\")     Body mass index is 30.34 kg/m².    PHYSICAL EXAM:    Constitutional:       General: Not in acute distress.     Appearance: Normal appearance. Well-developed.   Eyes:      Pupils: Pupils are equal, round, and reactive to light.   HENT:      Head: Normocephalic.   Neck:      Vascular: No carotid bruit or JVD.   Pulmonary:      Effort: Pulmonary effort is normal. No tachypnea.      Breath sounds: Normal breath sounds. No wheezing. No rales.   Cardiovascular:      Normal rate. Regular rhythm.      No gallop.    Pulses:     Intact distal pulses.   Edema:     Peripheral edema absent.   Abdominal:      General: Bowel sounds are normal.      Palpations: Abdomen is soft.      Tenderness: There is no abdominal tenderness.   Musculoskeletal: Normal range of motion.      Cervical back: Normal range of motion and neck supple. No edema. Skin:     General: Skin is warm and dry.   Neurological:      Mental Status: Alert and oriented to person, place, and time.           ECG 12 Lead    Date/Time: 5/14/2025 11:42 AM  Performed by: Doris Gomez, " MITRA    Authorized by: Doris Gomez APRN  Comparison: compared with previous ECG from 5/16/2024  Similar to previous ECG  Rhythm: sinus rhythm  Rate: normal  Q waves: III    QRS axis: normal    Clinical impression: normal ECG              Assessment/Plan:      1.  Coronary artery disease-status post PCI of the distal RCA, no angina symptoms, continue aspirin 81 mg daily and atorvastatin 40 mg daily    2.  Ischemic cardiomyopathy with improved EF to 51 to 55% continue metoprolol XL 25 mg daily    3.  History of inferior STEMI    4.  Essential hypertension blood pressure controlled on current regimen continue metoprolol XL 25 mg daily.    Follow-up in 1 year         Your medication list            Accurate as of May 14, 2025 11:38 AM. If you have any questions, ask your nurse or doctor.                CONTINUE taking these medications        Instructions Last Dose Given Next Dose Due   Aspirin Low Dose 81 MG EC tablet  Generic drug: aspirin      TAKE 1 TABLET BY MOUTH DAILY       atorvastatin 40 MG tablet  Commonly known as: LIPITOR      Take 1 tablet by mouth every night at bedtime. NEEDS CHOLESTEROL LABS       CVS D3 10 MCG (400 UNIT) capsule  Generic drug: Cholecalciferol      TAKE 2 TABLETS BY MOUTH EVERY DAY       escitalopram 20 MG tablet  Commonly known as: LEXAPRO      Take 1 tablet by mouth Daily.       metoprolol succinate XL 25 MG 24 hr tablet  Commonly known as: TOPROL-XL      TAKE 1 TABLET BY MOUTH DAILY       probenecid 500 MG tablet  Commonly known as: BENEMID      Take 1 tablet by mouth 2 (Two) Times a Day.                  As always, it has been a pleasure to participate in your patient's care.      Sincerely,     Doris MANRIQUEZ

## (undated) DEVICE — GLIDESHEATH BASIC HYDROPHILIC COATED INTRODUCER SHEATH: Brand: GLIDESHEATH

## (undated) DEVICE — CATH DIAG IMPULSE FR4 5F 100CM

## (undated) DEVICE — CATH DIAG IMPULSE PIG 5F 100CM

## (undated) DEVICE — NC TREK CORONARY DILATATION CATHETER 3.25 MM X 12 MM / RAPID-EXCHANGE: Brand: NC TREK

## (undated) DEVICE — DEV INDEFLATOR

## (undated) DEVICE — CATH DIAG IMPULSE FL3.5 5F 100CM

## (undated) DEVICE — TREK CORONARY DILATATION CATHETER 3.0 MM X 20 MM / RAPID-EXCHANGE: Brand: TREK

## (undated) DEVICE — 6F .070 JL4 100CM: Brand: CORDIS

## (undated) DEVICE — PK CATH CARD 40

## (undated) DEVICE — KT MANIFLD CARDIAC

## (undated) DEVICE — NC TREK CORONARY DILATATION CATHETER 3.5 MM X 20 MM / RAPID-EXCHANGE: Brand: NC TREK

## (undated) DEVICE — GW EMR FIX EXCHG J STD .035 3MM 260CM

## (undated) DEVICE — HI-TORQUE WHISPER ES GUIDE WIRE .014 STRAIGHTTIP 3.0 CM X 190 CM: Brand: HI-TORQUE WHISPER

## (undated) DEVICE — RUNTHROUGH NS EXTRA FLOPPY PTCA GUIDEWIRE: Brand: RUNTHROUGH